# Patient Record
Sex: FEMALE | Race: WHITE | NOT HISPANIC OR LATINO | URBAN - METROPOLITAN AREA
[De-identification: names, ages, dates, MRNs, and addresses within clinical notes are randomized per-mention and may not be internally consistent; named-entity substitution may affect disease eponyms.]

---

## 2020-08-07 ENCOUNTER — HOSPITAL ENCOUNTER (INPATIENT)
Facility: HOSPITAL | Age: 35
LOS: 1 days | Discharge: STILL ADMITTED OR ADMINISTRATIVE | DRG: 064 | End: 2020-08-08
Attending: EMERGENCY MEDICINE | Admitting: PSYCHIATRY & NEUROLOGY
Payer: OTHER GOVERNMENT

## 2020-08-07 DIAGNOSIS — I61.9 NONTRAUMATIC INTRACEREBRAL HEMORRHAGE, UNSPECIFIED CEREBRAL LOCATION, UNSPECIFIED LATERALITY: Primary | ICD-10-CM

## 2020-08-07 DIAGNOSIS — I61.4 CEREBELLAR HEMORRHAGE: ICD-10-CM

## 2020-08-07 PROBLEM — G93.5 BRAIN HERNIATION: Status: ACTIVE | Noted: 2020-08-07

## 2020-08-07 PROBLEM — J96.00 ACUTE RESPIRATORY FAILURE: Status: ACTIVE | Noted: 2020-08-07

## 2020-08-07 PROBLEM — I46.9 CARDIAC ARREST: Status: ACTIVE | Noted: 2020-08-07

## 2020-08-07 PROBLEM — R40.2430 GLASGOW COMA SCALE TOTAL SCORE 3-8: Status: ACTIVE | Noted: 2020-08-07

## 2020-08-07 LAB
ALBUMIN SERPL BCP-MCNC: 4.3 G/DL (ref 3.5–5.2)
ALLENS TEST: ABNORMAL
ALP SERPL-CCNC: 89 U/L (ref 55–135)
ALT SERPL W/O P-5'-P-CCNC: 57 U/L (ref 10–44)
ANION GAP SERPL CALC-SCNC: 11 MMOL/L (ref 8–16)
AST SERPL-CCNC: 47 U/L (ref 10–40)
BASOPHILS # BLD AUTO: 0.04 K/UL (ref 0–0.2)
BASOPHILS NFR BLD: 0.2 % (ref 0–1.9)
BILIRUB SERPL-MCNC: 0.3 MG/DL (ref 0.1–1)
BUN SERPL-MCNC: 12 MG/DL (ref 6–20)
CALCIUM SERPL-MCNC: 8.3 MG/DL (ref 8.7–10.5)
CHLORIDE SERPL-SCNC: 108 MMOL/L (ref 95–110)
CO2 SERPL-SCNC: 19 MMOL/L (ref 23–29)
CREAT SERPL-MCNC: 0.9 MG/DL (ref 0.5–1.4)
DELSYS: ABNORMAL
DIFFERENTIAL METHOD: ABNORMAL
EOSINOPHIL # BLD AUTO: 0 K/UL (ref 0–0.5)
EOSINOPHIL NFR BLD: 0 % (ref 0–8)
ERYTHROCYTE [DISTWIDTH] IN BLOOD BY AUTOMATED COUNT: 12.8 % (ref 11.5–14.5)
ERYTHROCYTE [SEDIMENTATION RATE] IN BLOOD BY WESTERGREN METHOD: 22 MM/H
EST. GFR  (AFRICAN AMERICAN): >60 ML/MIN/1.73 M^2
EST. GFR  (NON AFRICAN AMERICAN): >60 ML/MIN/1.73 M^2
FIO2: 80
GLUCOSE SERPL-MCNC: 198 MG/DL (ref 70–110)
HCO3 UR-SCNC: 21 MMOL/L (ref 24–28)
HCT VFR BLD AUTO: 43.8 % (ref 37–48.5)
HGB BLD-MCNC: 14 G/DL (ref 12–16)
IMM GRANULOCYTES # BLD AUTO: 0.2 K/UL (ref 0–0.04)
IMM GRANULOCYTES NFR BLD AUTO: 0.8 % (ref 0–0.5)
LYMPHOCYTES # BLD AUTO: 1 K/UL (ref 1–4.8)
LYMPHOCYTES NFR BLD: 4.2 % (ref 18–48)
MCH RBC QN AUTO: 28.2 PG (ref 27–31)
MCHC RBC AUTO-ENTMCNC: 32 G/DL (ref 32–36)
MCV RBC AUTO: 88 FL (ref 82–98)
MIN VOL: 10
MODE: ABNORMAL
MONOCYTES # BLD AUTO: 1.3 K/UL (ref 0.3–1)
MONOCYTES NFR BLD: 5.1 % (ref 4–15)
NEUTROPHILS # BLD AUTO: 21.9 K/UL (ref 1.8–7.7)
NEUTROPHILS NFR BLD: 89.7 % (ref 38–73)
NRBC BLD-RTO: 0 /100 WBC
PCO2 BLDA: 49.4 MMHG (ref 35–45)
PEEP: 5
PH SMN: 7.24 [PH] (ref 7.35–7.45)
PIP: 22
PLATELET # BLD AUTO: 463 K/UL (ref 150–350)
PLATELET BLD QL SMEAR: ABNORMAL
PMV BLD AUTO: 9.5 FL (ref 9.2–12.9)
PO2 BLDA: 124 MMHG (ref 80–100)
POC BE: -6 MMOL/L
POC SATURATED O2: 98 % (ref 95–100)
POC TCO2: 23 MMOL/L (ref 23–27)
POTASSIUM SERPL-SCNC: 4.8 MMOL/L (ref 3.5–5.1)
PROT SERPL-MCNC: 8 G/DL (ref 6–8.4)
RBC # BLD AUTO: 4.97 M/UL (ref 4–5.4)
SAMPLE: ABNORMAL
SARS-COV-2 RDRP RESP QL NAA+PROBE: NEGATIVE
SITE: ABNORMAL
SODIUM SERPL-SCNC: 138 MMOL/L (ref 136–145)
SP02: 98
VT: 450
WBC # BLD AUTO: 24.42 K/UL (ref 3.9–12.7)

## 2020-08-07 PROCEDURE — 86901 BLOOD TYPING SEROLOGIC RH(D): CPT

## 2020-08-07 PROCEDURE — 99291 PR CRITICAL CARE, E/M 30-74 MINUTES: ICD-10-PCS | Mod: ,,, | Performed by: PHYSICIAN ASSISTANT

## 2020-08-07 PROCEDURE — 27200966 HC CLOSED SUCTION SYSTEM

## 2020-08-07 PROCEDURE — 85025 COMPLETE CBC W/AUTO DIFF WBC: CPT

## 2020-08-07 PROCEDURE — 25500020 PHARM REV CODE 255: Performed by: EMERGENCY MEDICINE

## 2020-08-07 PROCEDURE — 94761 N-INVAS EAR/PLS OXIMETRY MLT: CPT

## 2020-08-07 PROCEDURE — 12000002 HC ACUTE/MED SURGE SEMI-PRIVATE ROOM

## 2020-08-07 PROCEDURE — 99499 UNLISTED E&M SERVICE: CPT | Mod: ,,, | Performed by: PSYCHIATRY & NEUROLOGY

## 2020-08-07 PROCEDURE — 85610 PROTHROMBIN TIME: CPT

## 2020-08-07 PROCEDURE — 99499 NO LOS: ICD-10-PCS | Mod: ,,, | Performed by: PSYCHIATRY & NEUROLOGY

## 2020-08-07 PROCEDURE — 27000221 HC OXYGEN, UP TO 24 HOURS

## 2020-08-07 PROCEDURE — 83036 HEMOGLOBIN GLYCOSYLATED A1C: CPT

## 2020-08-07 PROCEDURE — 80061 LIPID PANEL: CPT

## 2020-08-07 PROCEDURE — 94002 VENT MGMT INPAT INIT DAY: CPT

## 2020-08-07 PROCEDURE — 99232 PR SUBSEQUENT HOSPITAL CARE,LEVL II: ICD-10-PCS | Mod: ,,, | Performed by: NEUROLOGICAL SURGERY

## 2020-08-07 PROCEDURE — 82803 BLOOD GASES ANY COMBINATION: CPT

## 2020-08-07 PROCEDURE — 99291 CRITICAL CARE FIRST HOUR: CPT

## 2020-08-07 PROCEDURE — 99291 CRITICAL CARE FIRST HOUR: CPT | Mod: ,,, | Performed by: PHYSICIAN ASSISTANT

## 2020-08-07 PROCEDURE — 80053 COMPREHEN METABOLIC PANEL: CPT

## 2020-08-07 PROCEDURE — 36600 WITHDRAWAL OF ARTERIAL BLOOD: CPT

## 2020-08-07 PROCEDURE — 84443 ASSAY THYROID STIM HORMONE: CPT

## 2020-08-07 PROCEDURE — U0002 COVID-19 LAB TEST NON-CDC: HCPCS

## 2020-08-07 PROCEDURE — 27100171 HC OXYGEN HIGH FLOW UP TO 24 HOURS

## 2020-08-07 PROCEDURE — 99232 SBSQ HOSP IP/OBS MODERATE 35: CPT | Mod: ,,, | Performed by: NEUROLOGICAL SURGERY

## 2020-08-07 PROCEDURE — 99900035 HC TECH TIME PER 15 MIN (STAT)

## 2020-08-07 PROCEDURE — 85730 THROMBOPLASTIN TIME PARTIAL: CPT

## 2020-08-07 RX ORDER — SODIUM CHLORIDE 0.9 % (FLUSH) 0.9 %
10 SYRINGE (ML) INJECTION
Status: DISCONTINUED | OUTPATIENT
Start: 2020-08-08 | End: 2020-08-08 | Stop reason: HOSPADM

## 2020-08-07 RX ORDER — FAMOTIDINE 20 MG/1
20 TABLET, FILM COATED ORAL 2 TIMES DAILY
Status: DISCONTINUED | OUTPATIENT
Start: 2020-08-08 | End: 2020-08-08 | Stop reason: HOSPADM

## 2020-08-07 RX ORDER — ONDANSETRON 8 MG/1
8 TABLET, ORALLY DISINTEGRATING ORAL EVERY 8 HOURS PRN
Status: DISCONTINUED | OUTPATIENT
Start: 2020-08-08 | End: 2020-08-08 | Stop reason: HOSPADM

## 2020-08-07 RX ORDER — LABETALOL HCL 20 MG/4 ML
10 SYRINGE (ML) INTRAVENOUS EVERY 4 HOURS PRN
Status: DISCONTINUED | OUTPATIENT
Start: 2020-08-08 | End: 2020-08-08

## 2020-08-07 RX ORDER — ACETAMINOPHEN 325 MG/1
650 TABLET ORAL EVERY 6 HOURS PRN
Status: DISCONTINUED | OUTPATIENT
Start: 2020-08-08 | End: 2020-08-08 | Stop reason: HOSPADM

## 2020-08-07 RX ORDER — SODIUM CHLORIDE 9 MG/ML
INJECTION, SOLUTION INTRAVENOUS CONTINUOUS
Status: DISCONTINUED | OUTPATIENT
Start: 2020-08-07 | End: 2020-08-08 | Stop reason: HOSPADM

## 2020-08-07 RX ADMIN — IOHEXOL 100 ML: 350 INJECTION, SOLUTION INTRAVENOUS at 09:08

## 2020-08-08 ENCOUNTER — HOSPITAL ENCOUNTER (INPATIENT)
Facility: HOSPITAL | Age: 35
LOS: 2 days | DRG: 700 | End: 2020-08-10
Payer: COMMERCIAL

## 2020-08-08 VITALS
OXYGEN SATURATION: 96 % | WEIGHT: 208.31 LBS | TEMPERATURE: 97 F | DIASTOLIC BLOOD PRESSURE: 61 MMHG | HEIGHT: 66 IN | SYSTOLIC BLOOD PRESSURE: 96 MMHG | BODY MASS INDEX: 33.48 KG/M2 | HEART RATE: 98 BPM | RESPIRATION RATE: 14 BRPM

## 2020-08-08 DIAGNOSIS — Z52.9 ORGAN DONOR: ICD-10-CM

## 2020-08-08 DIAGNOSIS — Z01.89: ICD-10-CM

## 2020-08-08 DIAGNOSIS — Z52.9 ORGAN DONATION: ICD-10-CM

## 2020-08-08 PROBLEM — G93.5 BRAIN COMPRESSION: Status: ACTIVE | Noted: 2020-08-08

## 2020-08-08 PROBLEM — J96.02 ACUTE RESPIRATORY FAILURE WITH HYPOXIA AND HYPERCAPNIA: Status: ACTIVE | Noted: 2020-08-07

## 2020-08-08 PROBLEM — J96.01 ACUTE RESPIRATORY FAILURE WITH HYPOXIA AND HYPERCAPNIA: Status: ACTIVE | Noted: 2020-08-07

## 2020-08-08 LAB
ABO + RH BLD: NORMAL
ABO + RH BLD: NORMAL
ALBUMIN SERPL BCP-MCNC: 3.6 G/DL (ref 3.5–5.2)
ALBUMIN SERPL BCP-MCNC: 4.2 G/DL (ref 3.5–5.2)
ALLENS TEST: ABNORMAL
ALP SERPL-CCNC: 74 U/L (ref 55–135)
ALP SERPL-CCNC: 89 U/L (ref 55–135)
ALT SERPL W/O P-5'-P-CCNC: 41 U/L (ref 10–44)
ALT SERPL W/O P-5'-P-CCNC: 55 U/L (ref 10–44)
AMYLASE SERPL-CCNC: 1052 U/L (ref 20–110)
ANION GAP SERPL CALC-SCNC: 15 MMOL/L (ref 8–16)
ANION GAP SERPL CALC-SCNC: ABNORMAL MMOL/L (ref 8–16)
APTT BLDCRRT: 27.6 SEC (ref 21–32)
APTT BLDCRRT: 29.2 SEC (ref 21–32)
APTT BLDCRRT: 29.8 SEC (ref 21–32)
AST SERPL-CCNC: 20 U/L (ref 10–40)
AST SERPL-CCNC: 32 U/L (ref 10–40)
BASOPHILS # BLD AUTO: 0.03 K/UL (ref 0–0.2)
BASOPHILS # BLD AUTO: 0.04 K/UL (ref 0–0.2)
BASOPHILS NFR BLD: 0.1 % (ref 0–1.9)
BASOPHILS NFR BLD: 0.2 % (ref 0–1.9)
BILIRUB DIRECT SERPL-MCNC: 0.1 MG/DL (ref 0.1–0.3)
BILIRUB SERPL-MCNC: 0.3 MG/DL (ref 0.1–1)
BILIRUB SERPL-MCNC: 0.4 MG/DL (ref 0.1–1)
BILIRUB UR QL STRIP: NEGATIVE
BLD GP AB SCN CELLS X3 SERPL QL: NORMAL
BLD GP AB SCN CELLS X3 SERPL QL: NORMAL
BUN SERPL-MCNC: 10 MG/DL (ref 6–20)
BUN SERPL-MCNC: 8 MG/DL (ref 6–20)
CALCIUM SERPL-MCNC: 8.9 MG/DL (ref 8.7–10.5)
CALCIUM SERPL-MCNC: 9.6 MG/DL (ref 8.7–10.5)
CHLORIDE SERPL-SCNC: 111 MMOL/L (ref 95–110)
CHLORIDE SERPL-SCNC: >130 MMOL/L (ref 95–110)
CHOLEST SERPL-MCNC: 193 MG/DL (ref 120–199)
CHOLEST/HDLC SERPL: 4.4 {RATIO} (ref 2–5)
CK MB SERPL-MCNC: 8.6 NG/ML (ref 0.1–6.5)
CK MB SERPL-RTO: 8.3 % (ref 0–5)
CK SERPL-CCNC: 104 U/L (ref 20–180)
CK SERPL-CCNC: 104 U/L (ref 20–180)
CLARITY UR REFRACT.AUTO: CLEAR
CO2 SERPL-SCNC: 20 MMOL/L (ref 23–29)
CO2 SERPL-SCNC: 22 MMOL/L (ref 23–29)
COLOR UR AUTO: COLORLESS
CREAT SERPL-MCNC: 0.8 MG/DL (ref 0.5–1.4)
CREAT SERPL-MCNC: 0.8 MG/DL (ref 0.5–1.4)
DELSYS: ABNORMAL
DIFFERENTIAL METHOD: ABNORMAL
DIFFERENTIAL METHOD: ABNORMAL
EOSINOPHIL # BLD AUTO: 0 K/UL (ref 0–0.5)
EOSINOPHIL # BLD AUTO: 0 K/UL (ref 0–0.5)
EOSINOPHIL NFR BLD: 0 % (ref 0–8)
EOSINOPHIL NFR BLD: 0 % (ref 0–8)
ERYTHROCYTE [DISTWIDTH] IN BLOOD BY AUTOMATED COUNT: 12.8 % (ref 11.5–14.5)
ERYTHROCYTE [DISTWIDTH] IN BLOOD BY AUTOMATED COUNT: 13.6 % (ref 11.5–14.5)
ERYTHROCYTE [SEDIMENTATION RATE] IN BLOOD BY WESTERGREN METHOD: 12 MM/H
ERYTHROCYTE [SEDIMENTATION RATE] IN BLOOD BY WESTERGREN METHOD: 14 MM/H
ERYTHROCYTE [SEDIMENTATION RATE] IN BLOOD BY WESTERGREN METHOD: 16 MM/H
ERYTHROCYTE [SEDIMENTATION RATE] IN BLOOD BY WESTERGREN METHOD: 16 MM/H
ERYTHROCYTE [SEDIMENTATION RATE] IN BLOOD BY WESTERGREN METHOD: 22 MM/H
EST. GFR  (AFRICAN AMERICAN): >60 ML/MIN/1.73 M^2
EST. GFR  (AFRICAN AMERICAN): >60 ML/MIN/1.73 M^2
EST. GFR  (NON AFRICAN AMERICAN): >60 ML/MIN/1.73 M^2
EST. GFR  (NON AFRICAN AMERICAN): >60 ML/MIN/1.73 M^2
ESTIMATED AVG GLUCOSE: 103 MG/DL (ref 68–131)
ESTIMATED AVG GLUCOSE: 105 MG/DL (ref 68–131)
FIO2: 100
FIO2: 100
FIO2: 40
FIO2: 50
FLOW: 15
FLOW: 15
GGT SERPL-CCNC: 50 U/L (ref 8–55)
GLUCOSE SERPL-MCNC: 193 MG/DL (ref 70–110)
GLUCOSE SERPL-MCNC: 208 MG/DL (ref 70–110)
GLUCOSE UR QL STRIP: NEGATIVE
HBA1C MFR BLD HPLC: 5.2 % (ref 4–5.6)
HBA1C MFR BLD HPLC: 5.3 % (ref 4–5.6)
HCO3 UR-SCNC: 18.1 MMOL/L (ref 24–28)
HCO3 UR-SCNC: 18.1 MMOL/L (ref 24–28)
HCO3 UR-SCNC: 18.6 MMOL/L (ref 24–28)
HCO3 UR-SCNC: 21.9 MMOL/L (ref 24–28)
HCO3 UR-SCNC: 22.2 MMOL/L (ref 24–28)
HCO3 UR-SCNC: 23 MMOL/L (ref 24–28)
HCO3 UR-SCNC: 24.6 MMOL/L (ref 24–28)
HCO3 UR-SCNC: 25.5 MMOL/L (ref 24–28)
HCO3 UR-SCNC: 26.8 MMOL/L (ref 24–28)
HCT VFR BLD AUTO: 38.5 % (ref 37–48.5)
HCT VFR BLD AUTO: 41.9 % (ref 37–48.5)
HDLC SERPL-MCNC: 44 MG/DL (ref 40–75)
HDLC SERPL: 22.8 % (ref 20–50)
HGB BLD-MCNC: 12.2 G/DL (ref 12–16)
HGB BLD-MCNC: 13.5 G/DL (ref 12–16)
HGB UR QL STRIP: ABNORMAL
IMM GRANULOCYTES # BLD AUTO: 0.12 K/UL (ref 0–0.04)
IMM GRANULOCYTES # BLD AUTO: 0.16 K/UL (ref 0–0.04)
IMM GRANULOCYTES NFR BLD AUTO: 0.6 % (ref 0–0.5)
IMM GRANULOCYTES NFR BLD AUTO: 0.8 % (ref 0–0.5)
INR PPP: 0.9 (ref 0.8–1.2)
KETONES UR QL STRIP: NEGATIVE
LDH SERPL L TO P-CCNC: 226 U/L (ref 110–260)
LDLC SERPL CALC-MCNC: 120 MG/DL (ref 63–159)
LEUKOCYTE ESTERASE UR QL STRIP: NEGATIVE
LIPASE SERPL-CCNC: 62 U/L (ref 4–60)
LYMPHOCYTES # BLD AUTO: 0.5 K/UL (ref 1–4.8)
LYMPHOCYTES # BLD AUTO: 1.2 K/UL (ref 1–4.8)
LYMPHOCYTES NFR BLD: 2.5 % (ref 18–48)
LYMPHOCYTES NFR BLD: 5.8 % (ref 18–48)
MAGNESIUM SERPL-MCNC: 1.9 MG/DL (ref 1.6–2.6)
MAGNESIUM SERPL-MCNC: 2.5 MG/DL (ref 1.6–2.6)
MCH RBC QN AUTO: 28 PG (ref 27–31)
MCH RBC QN AUTO: 28.4 PG (ref 27–31)
MCHC RBC AUTO-ENTMCNC: 31.7 G/DL (ref 32–36)
MCHC RBC AUTO-ENTMCNC: 32.2 G/DL (ref 32–36)
MCV RBC AUTO: 87 FL (ref 82–98)
MCV RBC AUTO: 90 FL (ref 82–98)
MICROSCOPIC COMMENT: NORMAL
MODE: ABNORMAL
MONOCYTES # BLD AUTO: 1.2 K/UL (ref 0.3–1)
MONOCYTES # BLD AUTO: 1.7 K/UL (ref 0.3–1)
MONOCYTES NFR BLD: 5.7 % (ref 4–15)
MONOCYTES NFR BLD: 8.4 % (ref 4–15)
NEUTROPHILS # BLD AUTO: 17.2 K/UL (ref 1.8–7.7)
NEUTROPHILS # BLD AUTO: 19.7 K/UL (ref 1.8–7.7)
NEUTROPHILS NFR BLD: 84.9 % (ref 38–73)
NEUTROPHILS NFR BLD: 91 % (ref 38–73)
NITRITE UR QL STRIP: NEGATIVE
NONHDLC SERPL-MCNC: 149 MG/DL
NRBC BLD-RTO: 0 /100 WBC
NRBC BLD-RTO: 0 /100 WBC
PCO2 BLDA: 27.8 MMHG (ref 35–45)
PCO2 BLDA: 31.2 MMHG (ref 35–45)
PCO2 BLDA: 33.6 MMHG (ref 35–45)
PCO2 BLDA: 45.1 MMHG (ref 35–45)
PCO2 BLDA: 46.6 MMHG (ref 35–45)
PCO2 BLDA: 47.8 MMHG (ref 35–45)
PCO2 BLDA: 49.6 MMHG (ref 35–45)
PCO2 BLDA: 66.6 MMHG (ref 35–45)
PCO2 BLDA: 79.5 MMHG (ref 35–45)
PEEP: 5
PEEP: 8
PEEP: 8
PH SMN: 7.13 [PH] (ref 7.35–7.45)
PH SMN: 7.19 [PH] (ref 7.35–7.45)
PH SMN: 7.29 [PH] (ref 7.35–7.45)
PH SMN: 7.3 [PH] (ref 7.35–7.45)
PH SMN: 7.35 [PH] (ref 7.35–7.45)
PH SMN: 7.37 [PH] (ref 7.35–7.45)
PH SMN: 7.42 [PH] (ref 7.35–7.45)
PH UR STRIP: 6 [PH] (ref 5–8)
PHOSPHATE SERPL-MCNC: 2 MG/DL (ref 2.7–4.5)
PHOSPHATE SERPL-MCNC: 3.6 MG/DL (ref 2.7–4.5)
PLATELET # BLD AUTO: 360 K/UL (ref 150–350)
PLATELET # BLD AUTO: 376 K/UL (ref 150–350)
PMV BLD AUTO: 10.1 FL (ref 9.2–12.9)
PMV BLD AUTO: 10.8 FL (ref 9.2–12.9)
PO2 BLDA: 113 MMHG (ref 80–100)
PO2 BLDA: 160 MMHG (ref 80–100)
PO2 BLDA: 164 MMHG (ref 80–100)
PO2 BLDA: 191 MMHG (ref 80–100)
PO2 BLDA: 415 MMHG (ref 80–100)
PO2 BLDA: 445 MMHG (ref 80–100)
PO2 BLDA: 446 MMHG (ref 80–100)
PO2 BLDA: 487 MMHG (ref 80–100)
PO2 BLDA: 83 MMHG (ref 80–100)
POC BE: -2 MMOL/L
POC BE: -2 MMOL/L
POC BE: -3 MMOL/L
POC BE: -4 MMOL/L
POC BE: -4 MMOL/L
POC BE: -5 MMOL/L
POC BE: -6 MMOL/L
POC BE: -7 MMOL/L
POC BE: -7 MMOL/L
POC SATURATED O2: 100 % (ref 95–100)
POC SATURATED O2: 97 % (ref 95–100)
POC SATURATED O2: 98 % (ref 95–100)
POC SATURATED O2: 99 % (ref 95–100)
POC SATURATED O2: 99 % (ref 95–100)
POC TCO2: 19 MMOL/L (ref 23–27)
POC TCO2: 19 MMOL/L (ref 23–27)
POC TCO2: 20 MMOL/L (ref 23–27)
POC TCO2: 23 MMOL/L (ref 23–27)
POC TCO2: 24 MMOL/L (ref 23–27)
POC TCO2: 24 MMOL/L (ref 23–27)
POC TCO2: 26 MMOL/L (ref 23–27)
POC TCO2: 28 MMOL/L (ref 23–27)
POC TCO2: 29 MMOL/L (ref 23–27)
POTASSIUM SERPL-SCNC: 3.9 MMOL/L (ref 3.5–5.1)
POTASSIUM SERPL-SCNC: 4.2 MMOL/L (ref 3.5–5.1)
PROT SERPL-MCNC: 6.9 G/DL (ref 6–8.4)
PROT SERPL-MCNC: 7.8 G/DL (ref 6–8.4)
PROT UR QL STRIP: NEGATIVE
PROTHROMBIN TIME: 10.4 SEC (ref 9–12.5)
PROTHROMBIN TIME: 10.5 SEC (ref 9–12.5)
PROTHROMBIN TIME: 10.5 SEC (ref 9–12.5)
RBC # BLD AUTO: 4.3 M/UL (ref 4–5.4)
RBC # BLD AUTO: 4.83 M/UL (ref 4–5.4)
RBC #/AREA URNS AUTO: 1 /HPF (ref 0–4)
SAMPLE: ABNORMAL
SITE: ABNORMAL
SODIUM SERPL-SCNC: 145 MMOL/L (ref 136–145)
SODIUM SERPL-SCNC: 146 MMOL/L (ref 136–145)
SODIUM SERPL-SCNC: 161 MMOL/L (ref 136–145)
SP GR UR STRIP: 1 (ref 1–1.03)
SP02: 98
SP02: 99
SQUAMOUS #/AREA URNS AUTO: 0 /HPF
TRIGL SERPL-MCNC: 145 MG/DL (ref 30–150)
TROPONIN I SERPL DL<=0.01 NG/ML-MCNC: 0.68 NG/ML (ref 0–0.03)
TSH SERPL DL<=0.005 MIU/L-ACNC: 0.66 UIU/ML (ref 0.4–4)
URN SPEC COLLECT METH UR: ABNORMAL
VT: 450
VT: 460
VT: 480
VT: 480
VT: 550
WBC # BLD AUTO: 20.3 K/UL (ref 3.9–12.7)
WBC # BLD AUTO: 21.57 K/UL (ref 3.9–12.7)
WBC #/AREA URNS AUTO: 0 /HPF (ref 0–5)

## 2020-08-08 PROCEDURE — 36620 INSERTION CATHETER ARTERY: CPT

## 2020-08-08 PROCEDURE — 87086 URINE CULTURE/COLONY COUNT: CPT

## 2020-08-08 PROCEDURE — 99291 PR CRITICAL CARE, E/M 30-74 MINUTES: ICD-10-PCS | Mod: ,,, | Performed by: PSYCHIATRY & NEUROLOGY

## 2020-08-08 PROCEDURE — 83690 ASSAY OF LIPASE: CPT

## 2020-08-08 PROCEDURE — 25000003 PHARM REV CODE 250

## 2020-08-08 PROCEDURE — 82803 BLOOD GASES ANY COMBINATION: CPT

## 2020-08-08 PROCEDURE — 83615 LACTATE (LD) (LDH) ENZYME: CPT

## 2020-08-08 PROCEDURE — 85730 THROMBOPLASTIN TIME PARTIAL: CPT | Mod: 91

## 2020-08-08 PROCEDURE — 82550 ASSAY OF CK (CPK): CPT

## 2020-08-08 PROCEDURE — 83735 ASSAY OF MAGNESIUM: CPT

## 2020-08-08 PROCEDURE — 85730 THROMBOPLASTIN TIME PARTIAL: CPT

## 2020-08-08 PROCEDURE — 86850 RBC ANTIBODY SCREEN: CPT

## 2020-08-08 PROCEDURE — 99900025 HC BRONCHOSCOPY-ASST (STAT)

## 2020-08-08 PROCEDURE — 27200188 HC TRANSDUCER, ART ADULT/PEDS

## 2020-08-08 PROCEDURE — 82977 ASSAY OF GGT: CPT

## 2020-08-08 PROCEDURE — 84100 ASSAY OF PHOSPHORUS: CPT | Mod: 91

## 2020-08-08 PROCEDURE — 81001 URINALYSIS AUTO W/SCOPE: CPT

## 2020-08-08 PROCEDURE — 85610 PROTHROMBIN TIME: CPT | Mod: 91

## 2020-08-08 PROCEDURE — 83036 HEMOGLOBIN GLYCOSYLATED A1C: CPT

## 2020-08-08 PROCEDURE — 84100 ASSAY OF PHOSPHORUS: CPT

## 2020-08-08 PROCEDURE — 99900026 HC AIRWAY MAINTENANCE (STAT)

## 2020-08-08 PROCEDURE — 85025 COMPLETE CBC W/AUTO DIFF WBC: CPT | Mod: 91

## 2020-08-08 PROCEDURE — 99900035 HC TECH TIME PER 15 MIN (STAT)

## 2020-08-08 PROCEDURE — 37799 UNLISTED PX VASCULAR SURGERY: CPT

## 2020-08-08 PROCEDURE — 27200966 HC CLOSED SUCTION SYSTEM

## 2020-08-08 PROCEDURE — 36620 INSERTION CATHETER ARTERY: CPT | Mod: ,,, | Performed by: NURSE PRACTITIONER

## 2020-08-08 PROCEDURE — 84484 ASSAY OF TROPONIN QUANT: CPT

## 2020-08-08 PROCEDURE — 82248 BILIRUBIN DIRECT: CPT

## 2020-08-08 PROCEDURE — 94003 VENT MGMT INPAT SUBQ DAY: CPT

## 2020-08-08 PROCEDURE — 87070 CULTURE OTHR SPECIMN AEROBIC: CPT

## 2020-08-08 PROCEDURE — 36620 ARTERIAL LINE: ICD-10-PCS | Mod: ,,, | Performed by: NURSE PRACTITIONER

## 2020-08-08 PROCEDURE — 85025 COMPLETE CBC W/AUTO DIFF WBC: CPT

## 2020-08-08 PROCEDURE — 27202055 HC BRONCHOSCOPE, DISP

## 2020-08-08 PROCEDURE — 83735 ASSAY OF MAGNESIUM: CPT | Mod: 91

## 2020-08-08 PROCEDURE — 82553 CREATINE MB FRACTION: CPT

## 2020-08-08 PROCEDURE — 97802 MEDICAL NUTRITION INDIV IN: CPT

## 2020-08-08 PROCEDURE — 27000221 HC OXYGEN, UP TO 24 HOURS

## 2020-08-08 PROCEDURE — 94761 N-INVAS EAR/PLS OXIMETRY MLT: CPT

## 2020-08-08 PROCEDURE — 87205 SMEAR GRAM STAIN: CPT

## 2020-08-08 PROCEDURE — 87040 BLOOD CULTURE FOR BACTERIA: CPT

## 2020-08-08 PROCEDURE — 80053 COMPREHEN METABOLIC PANEL: CPT | Mod: 91

## 2020-08-08 PROCEDURE — 99291 CRITICAL CARE FIRST HOUR: CPT | Mod: ,,, | Performed by: PSYCHIATRY & NEUROLOGY

## 2020-08-08 PROCEDURE — 85610 PROTHROMBIN TIME: CPT

## 2020-08-08 PROCEDURE — 82150 ASSAY OF AMYLASE: CPT

## 2020-08-08 PROCEDURE — 84295 ASSAY OF SERUM SODIUM: CPT

## 2020-08-08 PROCEDURE — 20000000 HC ICU ROOM

## 2020-08-08 PROCEDURE — 25000003 PHARM REV CODE 250: Performed by: NURSE PRACTITIONER

## 2020-08-08 PROCEDURE — 80053 COMPREHEN METABOLIC PANEL: CPT

## 2020-08-08 PROCEDURE — 63600175 PHARM REV CODE 636 W HCPCS

## 2020-08-08 RX ORDER — FLUCONAZOLE 2 MG/ML
100 INJECTION, SOLUTION INTRAVENOUS
Status: DISCONTINUED | OUTPATIENT
Start: 2020-08-08 | End: 2020-08-19 | Stop reason: HOSPADM

## 2020-08-08 RX ORDER — PHENYLEPHRINE HCL IN 0.9% NACL 1 MG/10 ML
SYRINGE (ML) INTRAVENOUS
Status: COMPLETED
Start: 2020-08-08 | End: 2020-08-08

## 2020-08-08 RX ORDER — LEVALBUTEROL 1.25 MG/.5ML
1.25 SOLUTION, CONCENTRATE RESPIRATORY (INHALATION) EVERY 8 HOURS
Status: DISCONTINUED | OUTPATIENT
Start: 2020-08-09 | End: 2020-08-09

## 2020-08-08 RX ORDER — NOREPINEPHRINE BITARTRATE/D5W 4MG/250ML
PLASTIC BAG, INJECTION (ML) INTRAVENOUS
Status: COMPLETED
Start: 2020-08-08 | End: 2020-08-08

## 2020-08-08 RX ORDER — IPRATROPIUM BROMIDE 0.5 MG/2.5ML
0.5 SOLUTION RESPIRATORY (INHALATION) EVERY 4 HOURS
Status: DISCONTINUED | OUTPATIENT
Start: 2020-08-09 | End: 2020-08-19 | Stop reason: HOSPADM

## 2020-08-08 RX ORDER — ALBUTEROL SULFATE 2.5 MG/.5ML
2.5 SOLUTION RESPIRATORY (INHALATION) EVERY 4 HOURS PRN
Status: DISCONTINUED | OUTPATIENT
Start: 2020-08-08 | End: 2020-08-19 | Stop reason: HOSPADM

## 2020-08-08 RX ORDER — LABETALOL HCL 20 MG/4 ML
10 SYRINGE (ML) INTRAVENOUS EVERY 6 HOURS PRN
Status: DISCONTINUED | OUTPATIENT
Start: 2020-08-08 | End: 2020-08-08 | Stop reason: HOSPADM

## 2020-08-08 RX ORDER — SODIUM CHLORIDE 450 MG/100ML
INJECTION, SOLUTION INTRAVENOUS CONTINUOUS
Status: DISCONTINUED | OUTPATIENT
Start: 2020-08-08 | End: 2020-08-19 | Stop reason: HOSPADM

## 2020-08-08 RX ORDER — NOREPINEPHRINE BITARTRATE/D5W 4MG/250ML
0.02 PLASTIC BAG, INJECTION (ML) INTRAVENOUS CONTINUOUS
Status: DISCONTINUED | OUTPATIENT
Start: 2020-08-08 | End: 2020-08-08 | Stop reason: HOSPADM

## 2020-08-08 RX ORDER — METOPROLOL TARTRATE 1 MG/ML
5 INJECTION, SOLUTION INTRAVENOUS ONCE
Status: COMPLETED | OUTPATIENT
Start: 2020-08-08 | End: 2020-08-08

## 2020-08-08 RX ORDER — NOREPINEPHRINE BITARTRATE/D5W 4MG/250ML
0.02 PLASTIC BAG, INJECTION (ML) INTRAVENOUS CONTINUOUS
Status: DISCONTINUED | OUTPATIENT
Start: 2020-08-08 | End: 2020-08-19 | Stop reason: HOSPADM

## 2020-08-08 RX ORDER — NICARDIPINE HYDROCHLORIDE 0.2 MG/ML
2.5 INJECTION INTRAVENOUS CONTINUOUS
Status: DISCONTINUED | OUTPATIENT
Start: 2020-08-08 | End: 2020-08-08 | Stop reason: HOSPADM

## 2020-08-08 RX ORDER — METOPROLOL TARTRATE 1 MG/ML
INJECTION, SOLUTION INTRAVENOUS
Status: COMPLETED
Start: 2020-08-08 | End: 2020-08-08

## 2020-08-08 RX ADMIN — HYPROMELLOSE 2910 1 DROP: 5 SOLUTION OPHTHALMIC at 09:08

## 2020-08-08 RX ADMIN — VASOPRESSIN 0.01 UNITS/MIN: 20 INJECTION INTRAVENOUS at 04:08

## 2020-08-08 RX ADMIN — Medication 0.1 MCG/KG/MIN: at 03:08

## 2020-08-08 RX ADMIN — Medication 10 MG: at 09:08

## 2020-08-08 RX ADMIN — FAMOTIDINE 20 MG: 20 TABLET ORAL at 09:08

## 2020-08-08 RX ADMIN — SODIUM CHLORIDE 1000 ML: 0.9 INJECTION, SOLUTION INTRAVENOUS at 02:08

## 2020-08-08 RX ADMIN — FAMOTIDINE 20 MG: 20 TABLET ORAL at 01:08

## 2020-08-08 RX ADMIN — Medication 0.1 MCG/KG/MIN: at 09:08

## 2020-08-08 RX ADMIN — SODIUM CHLORIDE, SODIUM LACTATE, POTASSIUM CHLORIDE, AND CALCIUM CHLORIDE 500 ML: .6; .31; .03; .02 INJECTION, SOLUTION INTRAVENOUS at 09:08

## 2020-08-08 RX ADMIN — Medication 600 MCG: at 03:08

## 2020-08-08 RX ADMIN — SODIUM CHLORIDE 1000 ML: 0.9 INJECTION, SOLUTION INTRAVENOUS at 03:08

## 2020-08-08 RX ADMIN — AMPICILLIN AND SULBACTAM 1.5 G: 1; .5 INJECTION, POWDER, FOR SOLUTION INTRAVENOUS at 09:08

## 2020-08-08 RX ADMIN — HYDROCORTISONE SODIUM SUCCINATE 300 MG: 100 INJECTION, POWDER, FOR SOLUTION INTRAMUSCULAR; INTRAVENOUS at 09:08

## 2020-08-08 RX ADMIN — SODIUM CHLORIDE: 0.45 INJECTION, SOLUTION INTRAVENOUS at 10:08

## 2020-08-08 RX ADMIN — FLUCONAZOLE 100 MG: 2 INJECTION, SOLUTION INTRAVENOUS at 11:08

## 2020-08-08 RX ADMIN — Medication 0.1 MCG/KG/MIN: at 02:08

## 2020-08-08 RX ADMIN — SODIUM CHLORIDE: 0.9 INJECTION, SOLUTION INTRAVENOUS at 12:08

## 2020-08-08 RX ADMIN — POTASSIUM CHLORIDE: 2 INJECTION, SOLUTION, CONCENTRATE INTRAVENOUS at 09:08

## 2020-08-08 RX ADMIN — METOPROLOL TARTRATE 5 MG: 5 INJECTION INTRAVENOUS at 02:08

## 2020-08-08 RX ADMIN — METOPROLOL TARTRATE 5 MG: 1 INJECTION, SOLUTION INTRAVENOUS at 02:08

## 2020-08-08 NOTE — ED NOTES
Train of Four completed at bedside with assistance from ILDA Caldera with NCC  Pt R brow reactive 4 twitches on level 2

## 2020-08-08 NOTE — CARE UPDATE
2157 ABG obtained and reported. Rate and Vt increased, FiO2 weaned to 40% due to results per Everardo Caldera NP.  Patient tolerating well and no distress noted. Will continue to monitor.

## 2020-08-08 NOTE — CARE UPDATE
Apnea study done per Dr. Lees @ bedside with resulting ABG's using 15 liter nasal cannula inserted into ETT.

## 2020-08-08 NOTE — NURSING
Patient arrived to [facility] from [place] ed>[id number]  Type of stroke/diagnosis:  SAH/IPH    Current symptoms: on vent with no sedation, no reflexes, no movement on any extremities, bilateral pupils fixed    Skin assessment done: Yes  Wounds noted: none  EVENS Armband Applied: yes  Patient Belongings on Admit: no belongings    NCC notified: CAITLYN dillard    [facility]

## 2020-08-08 NOTE — PROGRESS NOTES
While giving 0900 meds, patients BP steadily increased to 230s/120s and HR steadily increased to 150s. CAITLYN Joshi with NCC notified. MD Luis at the bedside. Orders were to give 10mg of Labetalol and get a ABG. Neuro status still the same, GCS 3 and no reflexes. Family notified. Will continue to monitor.

## 2020-08-08 NOTE — PT/OT/SLP PROGRESS
Occupational Therapy      Patient Name:  Niecy Sevilla   MRN:  [id number]    OT consult received. Patient not seen today secondary to poor medical prognosis & condition.  Discussed via secure chat with neuro team & decision was to discontinue OT orders on this date.    BRITTANY Anand  8/8/2020

## 2020-08-08 NOTE — SIGNIFICANT EVENT
ICU Attending    Brain Death Note     brain death exam    Patient family outside room    No sedation in last 12 hours  Recent Labs   Lab 08/08/20 0205   WBC 21.57*   RBC 4.83   HGB 13.5   HCT 41.9   *   MCV 87   MCH 28.0   MCHC 32.2     Recent Labs   Lab 08/08/20 0205 08/08/20  0454   CALCIUM 8.9  --    PROT 7.8  --    * 145   K 3.9  --    CO2 20*  --    *  --    BUN 10  --    CREATININE 0.8  --    ALKPHOS 89  --    ALT 55*  --    AST 32  --    BILITOT 0.4  --      Recent Labs   Lab 08/08/20 0205   INR 0.9   APTT 29.8     Vent Mode: A/C  Oxygen Concentration (%):  [40-80] 40  Resp Rate Total:  [12 br/min-26 br/min] 14 br/min  Vt Set:  [450 mL-480 mL] 460 mL  PEEP/CPAP:  [5 cmH20] 5 cmH20  Pressure Support:  [0 cmH20] 0 cmH20  Mean Airway Pressure:  [7.9 quY93-95 cmH20] 8.6 cmH20    Temp: 97.2 °F (36.2 °C)  Pulse: 106  Rhythm: normal sinus rhythm, sinus tachycardia  BP: 96/61  MAP (mmHg): 73  Resp: 14  SpO2: 98 %  Oxygen Concentration (%): 40  O2 Device (Oxygen Therapy): ventilator  Vent Mode: A/C  Set Rate: 14 BPM  Vt Set: 460 mL  PEEP/CPAP: 5 cmH20  Peak Airway Pressure: 23 cmH2O  Mean Airway Pressure: 8.6 cmH20  Plateau Pressure: 0 cmH20    Temp  Min: 94.5 °F (34.7 °C)  Max: 98.7 °F (37.1 °C)  Pulse  Min: 82  Max: 132  BP  Min: 64/37  Max: 137/82  MAP (mmHg)  Min: 46  Max: 107  Resp  Min: 12  Max: 26  SpO2  Min: 94 %  Max: 100 %  Oxygen Concentration (%)  Min: 40  Max: 80    08/07 0701 - 08/08 0700  In: 2521.1 [I.V.:515.1]  Out: 2800 [Urine:2800]   Unmeasured Output  Stool Occurrence: 0  Emesis Occurrence: 0  Pad Count: 0    Nutrition Prescription Ordered  Current Diet Order: NPO  Last Bowel Movement: 08/07/20    Body mass index is 33.63 kg/m².      I have personally reviewed all labs, imaging, and studies today      No pupillary response  No corneal response  No occulocephalic response  Cold calorics negative   No Cough   No Gag    Apnea test performed at normothermia, O2 via NC into ETT,  PCO2 at 0, 4, 8 minutes: 49,66,79      Examination consistent with brain death    8/8/2020 16:29    Condolences given to Family at bedside    Carlos Manuel Lees MD MPH  Neurocritical Care

## 2020-08-08 NOTE — HPI
34-year-old female, transfer to [facility] ER for ICH management. She originally presented to to [facility] ER  Via EMS after being found down unrepsonve at home.  No significant past medical history is known about this patient.  What is known is 2 weeks ago she developed a headache and was seen at an outside facility.  A CT scan was negative for acute intracranial process.  Tonight the patient was at home in her normal state of health when fell and was found down unresponsive,. EMS arrived and in route to  outside facility she had a PEA cardiac arrest about 10 min before  ROSC, at which pouit she was intubated, around 1730.  She had a  had a subsequent PEA arrest when she arrived in the ED at the outside facility, which lasted about 5-7min.  A CT scan that showed a large cerebral bleed with subarachnoid component. Transfer center contacted and tranfers to [facility] NCC accepted, recs for Mannitol given. Mannitol 100gm was given about 1930. Patient arrived to [facility] ER, GCS 3T, on vent. TOF performed, 4/4 twitches on brow setting 2, to R/o any paralytic that may have been left from intubation earlier.  Exam very poor, concerns for herniation,. CTA with herniation and hydrocephalus. NSGY consulted, STAT.  Given poor exam, herniation present, no NSGY option offered. Awaiting family to discuss prognosis.

## 2020-08-08 NOTE — H&P
[facility]  Neurocritical Care  History & Physical    Admit Date: 8/7/2020  Service Date: 08/07/2020  Length of Stay: 0    Subjective:     Chief Complaint: Cerebellar hemorrhage    History of Present Illness: 34-year-old female, transfer to [facility] ER for ICH management. She originally presented to to [facility] ER  Via EMS after being found down unrepsonve at home.  No significant past medical history is known about this patient.  What is known is 2 weeks ago she developed a headache and was seen at an outside facility.  A CT scan was negative for acute intracranial process.  Tonight the patient was at home in her normal state of health when fell and was found down unresponsive,. EMS arrived and in route to  outside facility she had a PEA cardiac arrest about 10 min before  ROSC, at which pouit she was intubated, around 1730.  She had a  had a subsequent PEA arrest when she arrived in the ED at the outside facility, which lasted about 5-7min.  A CT scan that showed a large cerebral bleed with subarachnoid component. Transfer center contacted and tranfers to [facility] NCC accepted, recs for Mannitol given. Mannitol 100gm was given about 1930. Patient arrived to [facility] ER, GCS 3T, on vent. TOF performed, 4/4 twitches on brow setting 2, to R/o any paralytic that may have been left from intubation earlier.  Exam very poor, concerns for herniation,. CTA with herniation and hydrocephalus. NSGY consulted, STAT.  Given poor exam, herniation present, no NSGY option offered. Awaiting family to discuss prognosis.        No past medical history on file.  No past surgical history on file.   No current facility-administered medications on file prior to encounter.      No current outpatient medications on file prior to encounter.      Allergies: Patient has no known allergies.    No family history on file.  Social History     Tobacco Use    Smoking status: Not on file   Substance Use Topics    Alcohol use: Not on file     Drug use: Not on file     Review of Systems   Unable to perform ROS: Patient unresponsive     Objective:     Vitals:    Temp: 97.6 °F (36.4 °C)  Pulse: 110  BP: 116/64  MAP (mmHg): 84  Resp: (!) 22  SpO2: 100 %  Oxygen Concentration (%): 80  O2 Device (Oxygen Therapy): ventilator  Vent Mode: A/C  Set Rate: 22 BPM  Vt Set: 450 mL  Pressure Support: 0 cmH20  PEEP/CPAP: 5 cmH20  Peak Airway Pressure: 25 cmH2O  Mean Airway Pressure: 11 cmH20  Plateau Pressure: 0 cmH20    Temp  Min: 97.6 °F (36.4 °C)  Max: 98 °F (36.7 °C)  Pulse  Min: 82  Max: 132  BP  Min: 71/48  Max: 128/88  MAP (mmHg)  Min: 84  Max: 90  Resp  Min: 16  Max: 22  SpO2  Min: 94 %  Max: 100 %  Oxygen Concentration (%)  Min: 80  Max: 80    No intake/output data recorded.           Physical Exam  Vitals signs and nursing note reviewed.   Cardiovascular:      Rate and Rhythm: Tachycardia present.      Pulses: Normal pulses.   Pulmonary:      Effort: Pulmonary effort is normal.   Abdominal:      General: Bowel sounds are normal.      Palpations: Abdomen is soft.   Skin:     Capillary Refill: Capillary refill takes 2 to 3 seconds.   Neurological:      Comments: E1 V1t M1  Right pupil 3mm, irregular and fixed/ left pupil 2mm reactive  No cough, gag or corneal   No motor response to pain all extremities  Not breathing over vent, rate decrease to 1           Today I personally reviewed pertinent medications, lines/drains/airways, imaging, cardiology results, laboratory results, microbiology results,       Assessment/Plan:     Neuro  Radha coma scale total score 3-8  GCS 3T  Herniation on CTA    Brain herniation  Acute downward herniation  Mannitol 100gm given priot to arrival  Exam poor, no NSGY option offered.      Cerebellar hemorrhage  Found unrepsonsive and code x 2 atOSH, prior to arrival to [facility]CC  CTH with right cerebellar hemorrhage and sah at OSH,   exam on presentation to [facility] ER,  very poor, GCS 3T, concerns for herniation  STAT CTA with increased  Right cerebellar hemorrhage and downward herniation.   NSGY consulted, after review of images and patient exam, no NSGY option offered.  Patient has also had 2 cardiac arrest prior to arrival.   Patient has poor prognosis overall, and will no meaningful recovery and possibly death. Concerns she is progressing toward   brain death.   Awaiting family to discuss prognosis and goals.     Pulmonary  Acute respiratory failure  intubated at OSH during cardiac arrest  Vent adjusted to decrease CO2  Will wean Fio2 as able    Cardiac/Vascular  Cardiac arrest  PEA arrest x 2 at OSH  1st arrest ROSC with 10 min  2nd Arrest ROSC within 7 min  Given poor exam, overall prognosis in addition ot intracranial bleed, no hypothermia protocol initiated.           The patient is being Prophylaxed for:  Venous Thromboembolism with: Mechanical  Stress Ulcer with: Not Applicable   Ventilator Pneumonia with: chlorhexidine oral care    Activity Orders          None        No Order   Critical Care Time: 80 min.     Everardo Caldera NP  Neurocritical Care  [facility]

## 2020-08-08 NOTE — CONSULTS
"  [facility]  Adult Nutrition  Consult Note    SUMMARY     Recommendations    Recommendation:   1. If TF warranted, suggest Impact peptide @ 10 mL/hr increase to goal rate of 35 mL/hr to provide pt with 1260 kcal, 79 g protein and 647 mL free water.    -Additional water per MD. Hold for residuals >500 mL.   2. RD to monitor and follow up    Goals: pt to receive nutrition by RD follow up  Nutrition Goal Status: new  Communication of RD Recs: other (comment)(POC)    Reason for Assessment    Reason For Assessment: consult  Diagnosis: (Cerebellar hemorrhage)  Relevant Medical History: none  Interdisciplinary Rounds: did not attend  General Information Comments: Pt intubated on vent at this time, no sedation per chart. Team in room, no family at bedside. OG in place. DAPHNIE nutrition hx and no recent wt hx in chart. Per chart review, awaiting family decision on care. Unable to complete NFPE at this time, pt with no indications of malnutrition currently.  Nutrition Discharge Planning: unable to determine    Nutrition Risk Screen    Nutrition Risk Screen: dysphagia or difficulty swallowing    Nutrition/Diet History    Food Allergies: NKFA  Factors Affecting Nutritional Intake: on mechanical ventilation, NPO    Anthropometrics    Temp: 97.2 °F (36.2 °C)  Height Method: Measured  Height: 5' 6" (167.6 cm)  Height (inches): 66 in  Weight Method: Bed Scale  Weight: 94.5 kg (208 lb 5.4 oz)  Weight (lb): 208.34 lb  Ideal Body Weight (IBW), Female: 130 lb  % Ideal Body Weight, Female (lb): 160.26 %  BMI (Calculated): 33.6  BMI Grade: 30 - 34.9- obesity - grade I       Lab/Procedures/Meds    Pertinent Labs Reviewed: reviewed  Pertinent Labs Comments: Glucose 208  Pertinent Medications Reviewed: reviewed  Pertinent Medications Comments: famotidine; metoprolol    Estimated/Assessed Needs    Weight Used For Calorie Calculations: 94.5 kg (208 lb 5.4 oz)  Energy Calorie Requirements (kcal): 6545-2295 kcal/d  Energy Need " Method: Kcal/kg  Protein Requirements:  g/day(1.5-2.0 g/kg IBW)  Weight Used For Protein Calculations: 59 kg (130 lb)  Fluid Requirements (mL): 1 mL/kcal or per MD  RDA Method (mL): 1039    Nutrition Prescription Ordered    Current Diet Order: NPO    Evaluation of Received Nutrient/Fluid Intake    I/O: -2. L since admit  Energy Calories Required: not meeting needs  Protein Required: not meeting needs  Fluid Required: other (see comments)  Comments: LBM 8/7  Tolerance: not tolerating  % Intake of Estimated Energy Needs: 0 - 25 %  % Meal Intake: NPO    Nutrition Risk    Level of Risk/Frequency of Follow-up: high     Assessment and Plan  Nutrition Problem  inadequate energy intake    Related to (etiology):   NPO 2/2 intubated     Signs and Symptoms (as evidenced by):   NPO with no means of nutrition     Interventions (treatment strategy):  Collaboration of care with other providers    Nutrition Diagnosis Status:   New    Monitor and Evaluation    Food and Nutrient Intake: enteral nutrition intake, energy intake  Food and Nutrient Adminstration: enteral and parenteral nutrition administration, diet order  Anthropometric Measurements: weight, weight change  Biochemical Data, Medical Tests and Procedures: electrolyte and renal panel, gastrointestinal profile, glucose/endocrine profile, inflammatory profile, lipid profile  Nutrition-Focused Physical Findings: overall appearance     Nutrition Follow-Up    RD Follow-up?: Yes

## 2020-08-08 NOTE — PLAN OF CARE
Recommendations    Recommendation:   1. If TF warranted, suggest Impact peptide @ 10 mL/hr increase to goal rate of 35 mL/hr to provide pt with 1260 kcal, 79 g protein and 647 mL free water.    -Additional water per MD. Hold for residuals >500 mL.   2. RD to monitor and follow up    Goals: pt to receive nutrition by RD follow up  Nutrition Goal Status: new  Communication of RD Recs: other (comment)(POC)

## 2020-08-08 NOTE — ASSESSMENT & PLAN NOTE
NSGY no intervention offered  Grave prognosis  Family updated  CPP goal > 50 if able  Brain death exam today

## 2020-08-08 NOTE — ASSESSMENT & PLAN NOTE
intubated at OSH during cardiac arrest  Vent adjusted to decrease CO2  Will wean Fio2 as able  Repeat ABG reviewed  PCO2 goal 30-35 in Am then to 40 prior to Brain death apnea test

## 2020-08-08 NOTE — ASSESSMENT & PLAN NOTE
Acute downward herniation  Mannitol 100gm given priot to arrival  Exam poor, no NSGY option offered.

## 2020-08-08 NOTE — NURSING
BP cuff reading 56/32 (40) with IVF bolus going. Sheard NP aware and order to give sumanth bump, start another 1L bolus, and hang Levo gtt.    x1 sumanth bump and BP 64/37 (46)  x2 sumanth bumps and BP 66/37 (47)    Sheard NP placing emergent arterial line; x3 more sumanth bumps given. Levo gtt 0.12 mcg/kg/min SBP reading 115 with MAPs 90s.

## 2020-08-08 NOTE — HPI
34 F with unknown PMHX presents GCS 3T s/p 2x PEA arrest with CTH revealing R cerebellar hemorrhage, subsequent scan with tonsilar herniation w/associated obstructive hydrocephalus. Reportedly 2 weeks ago she developed a headache and was seen at an outside facility, negative CTH at this time.  Per report, tonight the patient was at home in her normal state of health when she syncopized.  En route to the outside facility she had a PEA cardiac arrest. ROSC was achieved in the field and then she had a subsequent PEA arrest when she arrived in the ED at [place].  CTH at OSH with R cerebellar hemorrhage with some mass effect on 4th ventricle and early hydrocephalus. Given 100g Mannitol at OSH and transferred to [facility]     Upon arrival to our facility the patient is on no drips and no sedation.  She has a GCS of 3.

## 2020-08-08 NOTE — ED PROVIDER NOTES
Encounter Date: 8/7/2020       History     Chief Complaint   Patient presents with    [facility] transfer     nontraumatic SAH. Intubated      34-year-old female to the emergency department as a transfer from [place].  No significant past medical history is known about this patient.  What is known is 2 weeks ago she developed a headache and was seen at an outside facility.  A CT scan was negative for acute intracranial process.  Tonight the patient was at home in her normal state of health when she syncopized.  And route to the outside facility she had a PEA cardiac arrest. ROSC  Gotten in the field and then she had a subsequent PEA arrest when she arrived in the ED at the outside facility.  A CT scan that showed a large cerebral bleed with subarachnoid component.      She was given mannitol and transferred here.    Upon arrival to our facility the patient is on no drips and no sedation.  She has a GCS of 3.        Review of patient's allergies indicates:  Not on File  No past medical history on file.  No past surgical history on file.  No family history on file.  Social History     Tobacco Use    Smoking status: Not on file   Substance Use Topics    Alcohol use: Not on file    Drug use: Not on file     Review of Systems   Unable to perform ROS: Intubated       Physical Exam     Initial Vitals [08/07/20 2108]   BP Pulse Resp Temp SpO2   128/88 82 (!) 22 98 °F (36.7 °C) 98 %      MAP       --         Physical Exam    Constitutional: Vital signs are normal. She appears well-developed and well-nourished.   HENT:   Head: Normocephalic and atraumatic.   Right Ear: Hearing normal.   Left Ear: Hearing normal.   Eyes: Conjunctivae are normal.   Cardiovascular: Normal rate and regular rhythm.   Abdominal: Soft. Normal appearance and bowel sounds are normal.   Neurological:    Intubated   right pupil 5 mm and fixed   Left pupil 2 mm and  Fixed   no corneal reflex, no gag reflex, not withdrawing   Skin: Skin is warm and  intact.         ED Course   Critical Care    Date/Time: 8/7/2020 9:49 PM  Performed by: Everardo Morgan PA-C  Authorized by: John Paul Agustin Jr., MD   Direct patient critical care time: 15 minutes  Additional history critical care time: 10 minutes  Ordering / reviewing critical care time: 10 minutes  Documentation critical care time: 10 minutes  Consulting other physicians critical care time: 10 minutes  Total critical care time (exclusive of procedural time) : 55 minutes  Critical care was necessary to treat or prevent imminent or life-threatening deterioration of the following conditions: CNS failure or compromise.  Critical care was time spent personally by me on the following activities: review of old charts, pulse oximetry, ventilator management, re-evaluation of patient's condition and examination of patient.        Labs Reviewed   SARS-COV-2 RNA AMPLIFICATION, QUAL   CBC W/ AUTO DIFFERENTIAL   COMPREHENSIVE METABOLIC PANEL          Imaging Results          CTA Brain (In process)  Result time 08/07/20 21:45:24                 Medical Decision Making:   History:   Old Medical Records: I decided to obtain old medical records.  Old Records Summarized: records from another hospital.  Initial Assessment:    34-year-old female with a large head bleed  Differential Diagnosis:    ICH, herniation, subarachnoid bleed  Clinical Tests:   Lab Tests: Ordered and Reviewed  Radiological Study: Ordered and Reviewed  ED Management:   Plan:   patient emergently brought to ED bed 2.  Neurosurgery and neurocritical care contacted and both at the bedside within 5 min.    we will get a CTA.   ET tube appears to be  Appropriately positioned.  To the patient's blood pressure is 120 systolic.  1145pm  NCC will admit the pt. Awaiting on family arrival, pt has minimal brain activity based on physical exam and a detrimental head bleed. Unlikely that she will be able to recover / survive without life support. We will discuss options  with the family when they arrive.   Other:   I discussed test(s) with the performing physician.  I have discussed this case with another health care provider.                                 Clinical Impression:       ICD-10-CM ICD-9-CM   1. Nontraumatic intracerebral hemorrhage, unspecified cerebral location, unspecified laterality  I61.9 431         Disposition:   Disposition: Admitted  Condition: Critical     ED Disposition Condition    Admit                           Everardo Morgan PA-C  08/08/20 0000

## 2020-08-08 NOTE — ED TRIAGE NOTES
"Patient arrives via [facility] as transfer from [facility]. Per flight RN, patient has been having "migraines" for 2wks. Had an unwitnessed syncopal episode at home today while having a bm. Mother called 911. Patient became unresponsive during ems transport to [facility] ER. Patient then went into PEA. ROSC was achieved by ems after approx 4 min. Patient then went PEA in ER and ROSC was achieved after approx 5min. CT showed R cerebellar hemorrhage/SAH. Patient was given 1g Mannitol at [facility] at 1920. Patient arrives to [facility] ED intubated and on no sedation or gtt. Patient has GCS 3 with R pupil 4mm, non-reactive, and irregular. [facility] contacted upon patient's arrival. Neuro CC and ED provider at bedside. Mother, Nemo Babin, in route to ED per flight care RN. Approx ETA 2hrs. Per medical records from transfer hospital, patient has no PMHx, does not take any Rx, and has NKDA.  "

## 2020-08-08 NOTE — ASSESSMENT & PLAN NOTE
34 F with unknown PMHx presents with R cerebellar hemorrhage (ICHS 3) s/p 2 PEA arrests:    No acute neurosurgical intervention  Patient with very poor prognosis, GCS 3T on exam with L pupil reactivity otherwise no brain stem reflexes. Tonsillar herniation w/obstructive hydrocephalus on repeat CT obtained on arrival.  No neurosurgical intervention would provide meaningful improvement in patient's quality of life or prognosis.  NSGY will sign off. Rec GOC conversation with family, can be present for discussion if needed. Continue NCC care pending family discussion.    D/w Dr. Ortez

## 2020-08-08 NOTE — CONSULTS
Inpatient consult to Physical Medicine Rehab  Consult performed by: Yvonne Pineda NP  Consult ordered by: Everardo Caldera NP  Reason for consult: Assess rehab needs      Patient is too acute at this time; rehab potential cannot be appropriately assessed.  Recommend early mobility, OOB in chair, and PT/OT/SLP when appropriate.  Will follow for additional rehab needs and post-acute recommendation when patient is medically stable and able to participate with therapy.    Thank you for consult.    KATIE Tee, FNP-C  Physical Medicine & Rehabilitation   08/08/2020

## 2020-08-08 NOTE — PROGRESS NOTES
[facility]  Neurocritical Care  Progress Note    Admit Date: 8/7/2020  Service Date: 08/08/2020  Length of Stay: 1    Subjective:     Chief Complaint: Cerebellar hemorrhage    History of Present Illness: 34-year-old female, [place] [facility] [place]. She originally presented to to [facility] ER  Via EMS after being found down unrepsonve at home.  No significant past medical history is known about this patient.  What is known is 2 weeks ago she developed a headache and was seen at an outside facility.  A CT scan was negative for acute intracranial process.  Tonight the patient was at home in her normal state of health when fell and was found down unresponsive,. EMS arrived and in route to  outside facility she had a PEA cardiac arrest about 10 min before  ROSC, at which pouit she was intubated, around 1730.  She had a  had a subsequent PEA arrest when she arrived in the ED at the outside facility, which lasted about 5-7min.  A CT scan that showed a large cerebral bleed with subarachnoid component. Transfer center contacted and [place] [facility] [place], recs for Mannitol given. Mannitol 100gm was given about 1930. Patient [place] [facility] [place], GCS 3T, on vent. TOF performed, 4/4 twitches on brow setting 2, to R/o any paralytic that may have been left from intubation earlier.  Exam very poor, concerns for herniation,. CTA with herniation and hydrocephalus. NSGY consulted, STAT.  Given poor exam, herniation present, no NSGY option offered. Awaiting family to discuss prognosis.        Hospital Course: 8/7: transfer from OSH, GCS 3T, intubated, exam very poor, out of porportio to previous [facility]. NSGY consulted, CTA pending  8/8: imaging reviewed, family updated at bedside, brain death exam today    Review of Symptoms:   Unable to obtain, intubated, neuro-exam    Physical Exam:  GA: comfortable, no acute distress.   HEENT: No scleral icterus or JVD.   Pulmonary: Clear to auscultation A/L. No  wheezing, crackles, or rhonchi. intubated  Cardiac: RRR S1 & S2 w/o rubs/murmurs/gallops.   Abdominal: Bowel sounds present x 4. No appreciable hepatosplenomegaly.  Skin: No jaundice, rashes, or visible lesions.  Pulses: 1+ Dp bilat    Neuro:  --sedation: none  --GCS: [id number]  --Mental Status: coma, no commands no response   --pupils bilat 5 mm fixed  --no corneals  --no occulocephalics  --no cough  --no gag  --Motor: flaccid throughout  --sensory: see motor  --Reflexes: not tested  --Gait: deferred    Recent Labs   Lab 08/08/20  0205   WBC 21.57*   RBC 4.83   HGB 13.5   HCT 41.9   *   MCV 87   MCH 28.0   MCHC 32.2     Recent Labs   Lab 08/08/20  0205 08/08/20  0454   CALCIUM 8.9  --    PROT 7.8  --    * 145   K 3.9  --    CO2 20*  --    *  --    BUN 10  --    CREATININE 0.8  --    ALKPHOS 89  --    ALT 55*  --    AST 32  --    BILITOT 0.4  --      Recent Labs   Lab 08/08/20  0205   INR 0.9   APTT 29.8     Vent Mode: A/C  Oxygen Concentration (%):  [40-80] 40  Resp Rate Total:  [16 br/min-26 br/min] 16 br/min  Vt Set:  [450 mL-480 mL] 480 mL  PEEP/CPAP:  [5 cmH20] 5 cmH20  Pressure Support:  [0 cmH20] 0 cmH20  Mean Airway Pressure:  [9.3 tlK06-21 cmH20] 9.4 cmH20    Temp: 97 °F (36.1 °C)  Pulse: 107  Rhythm: normal sinus rhythm, sinus tachycardia  BP: 96/61  MAP (mmHg): 73  Resp: 16  SpO2: 96 %  Oxygen Concentration (%): 40  O2 Device (Oxygen Therapy): ventilator  Vent Mode: A/C  Set Rate: 16 BPM  Vt Set: 480 mL  PEEP/CPAP: 5 cmH20  Peak Airway Pressure: 24 cmH2O  Mean Airway Pressure: 9.4 cmH20  Plateau Pressure: 0 cmH20    Temp  Min: 94.5 °F (34.7 °C)  Max: 98.7 °F (37.1 °C)  Pulse  Min: 82  Max: 132  BP  Min: 64/37  Max: 137/82  MAP (mmHg)  Min: 46  Max: 107  Resp  Min: 16  Max: 26  SpO2  Min: 94 %  Max: 100 %  Oxygen Concentration (%)  Min: 40  Max: 80    08/07 0701 - 08/08 0700  In: 2521.1 [I.V.:515.1]  Out: 2800 [Urine:2800]   Unmeasured Output  Stool Occurrence: 0  Emesis Occurrence: 0  Pad  Count: 0    Nutrition Prescription Ordered  Current Diet Order: NPO  Last Bowel Movement: 08/07/20    Body mass index is 33.63 kg/m².      I have personally reviewed all labs, imaging, and studies today      Assessment/Plan:     Neuro  * Cerebellar hemorrhage  NSGY no intervention offered  Grave prognosis  Family updated  CPP goal > 50 if able  Brain death exam today     [place] coma scale total score 3-8  GCS 3T  Herniation on CTA    Brain herniation  Acute downward herniation  Mannitol 100gm given priot to arrival  Exam poor, no NSGY option offered.      Pulmonary  Acute respiratory failure with hypoxia and hypercapnia  intubated at OSH during cardiac arrest  Vent adjusted to decrease CO2  Will wean Fio2 as able  Repeat ABG reviewed  PCO2 goal 30-35 in Am then to 40 prior to Brain death apnea test    Cardiac/Vascular  Cardiac arrest  PEA arrest x 2 at OSH  1st arrest ROSC with 10 min  2nd Arrest ROSC within 7 min  Given poor exam, overall prognosis in addition ot intracranial bleed, no hypothermia protocol initiated.           The patient is being Prophylaxed for:  Venous Thromboembolism with: Mechanical  Stress Ulcer with: H2B  Ventilator Pneumonia with: chlorhexidine oral care    Activity Orders          Commode at bedside starting at 08/07 2334    Diet NPO: NPO starting at 08/07 2334        Partial Code    Carlos Manuel Lees MD  Neurocritical Care  [facility]    43     minutes of Critical care time was spent personally by me on the following activities: development of treatment plan with patient or surrogate and bedside caregivers, discussions with consultants, evaluation of patient's response to treatment, examination of patient, ordering and performing treatments and interventions, ordering and review of laboratory studies, ordering and review of radiographic studies, pulse oximetry, antibiotic titration if applicable, vasopressor titration if applicable, re-evaluation of patient's  condition. This critical care time did not overlap with that of any other provider or involve time for any procedures. There is potential for acute life threatening neurological and or medical decompensation therefore will continue to monitor closely. Current critical conditions posing threat to organ systems, limb, or life include:  Cerebellar hemorrhage, brain edema, brain compression/herniation, respiratory failure, COMA, cardiac arrest

## 2020-08-08 NOTE — SIGNIFICANT EVENT
Family arrived, NSGY and I discussed series of events, exam, imaging and overall prognosis. We discussed the high probability of impending brain herniation, possibility of brain death or permanent vegetative state if Niecy survives.      The mother and family asked what options were available, and voiced she would not want her daughter in permanent coma.   We explained that while there is no surgical option, we could continue with medical management, with the understanding that patient will probably be in permanent vegetative state if she survived. We also dicussed comfort care and the process involved.   In addition I covered the DNR/Partial code status.      The family discussed amongst themselves. The decided to make the patient a DNR and to look at withdrawing care later in the morning when the other family would be able to attend.     Everardo Caldera [facility]  NeuroCritical Care

## 2020-08-08 NOTE — PLAN OF CARE
POC reviewed with pt mother, Bonnie, at 0500. Mother verbalized understanding. Questions and concerns addressed. No acute events overnight. Pt progressing toward goals. Will continue to monitor. See flowsheets for full assessment and VS info.    NS @ 75cc  Levo gtt for MAPs > 65  Vaso 0.01 for increased UOP  Bare hugger for low temp  Arterial Line placed  SUSY following

## 2020-08-08 NOTE — SUBJECTIVE & OBJECTIVE
(Not in a hospital admission)      Review of patient's allergies indicates:  No Known Allergies    No past medical history on file.  No past surgical history on file.  Family History     None        Tobacco Use    Smoking status: Not on file   Substance and Sexual Activity    Alcohol use: Not on file    Drug use: Not on file    Sexual activity: Not on file     Review of Systems   Unable to perform ROS: Patient unresponsive     Objective:     Weight: 98 kg (216 lb 0.8 oz)  There is no height or weight on file to calculate BMI.  Vital Signs (Most Recent):  Temp: 97.6 °F (36.4 °C) (08/07/20 2115)  Pulse: 110 (08/07/20 2149)  Resp: (!) 22 (08/07/20 2108)  BP: 116/64 (08/07/20 2149)  SpO2: 100 % (08/07/20 2149) Vital Signs (24h Range):  Temp:  [97.6 °F (36.4 °C)-98 °F (36.7 °C)] 97.6 °F (36.4 °C)  Pulse:  [] 110  Resp:  [16-22] 22  SpO2:  [94 %-100 %] 100 %  BP: ()/(48-88) 116/64                Vent Mode: A/C  Oxygen Concentration (%):  [80] 80  Resp Rate Total:  [22 br/min] 22 br/min  Vt Set:  [450 mL] 450 mL  PEEP/CPAP:  [5 cmH20] 5 cmH20  Pressure Support:  [0 cmH20] 0 cmH20  Mean Airway Pressure:  [11 cmH20] 11 cmH20         NG/OG Tube 08/07/20 orogastric Center mouth (Active)            Urethral Catheter 08/07/20 Latex (Active)       Neurosurgery Physical Exam   Intubated, off sedation  R pupil 5mm fixed  L pupil 2-3mm, not reliably reactive to penlight, reactive via pupilometer  No cough/gag  No corneals to gauze/saline drops  No movement to central or peripheral stimulus  No spontaneous movement    Significant Labs:  No results for input(s): GLU, NA, K, CL, CO2, BUN, CREATININE, CALCIUM, MG in the last 48 hours.  No results for input(s): WBC, HGB, HCT, PLT in the last 48 hours.  No results for input(s): LABPT, INR, APTT in the last 48 hours.  Microbiology Results (last 7 days)     ** No results found for the last 168 hours. **          Significant Diagnostics:  CTH from OSH reviewed, per HPI  CTA  on arrival with R cerebellar hemorrhage, tonsilar herniation and obstructive hydrocephalus

## 2020-08-08 NOTE — PLAN OF CARE
Brain death studies performed by MD Yoana. SUSY has been at the bedside all day meeting patient families needs. Levo gtt infusing. GCS 3 and no reflexes present. POC reviewed with family at 1630.  Family verbalized understanding. Questions and concerns addressed. See flowsheets for full assessment and VS info. Transitioning care to SUSY.

## 2020-08-08 NOTE — ASSESSMENT & PLAN NOTE
PEA arrest x 2 at OSH  1st arrest ROSC with 10 min  2nd Arrest ROSC within 7 min  Given poor exam, overall prognosis in addition ot intracranial bleed, no hypothermia protocol initiated.

## 2020-08-08 NOTE — CARE UPDATE
Patient a transfer via EMS on portable vent.  Patient has a 7.0 ETT taped midline 23cm at lip.  Patient placed on  vent with the following settings: AC 22/450/+5/80%. Patient tolerating well. No distress noted. Alarms set and functioning. Ambu bag and mask at [facility]. Will continue to monitor.

## 2020-08-08 NOTE — CHAPLAIN
Spiritual Care Encounter   Please contact the Department of Spiritual Care, your Unit  or the On-call  at any time if any needs are brought to your attention. i.e. Anytime you note spiritual, existential or emotional distress or there is a new diagnosis and/or prognosis.      Purpose:  Visit Type: Initial Visit  Visit Category: Critical Care  Visited With: Patient and family together  Number of Family Visited: 1(patient's mother)  Length of Visit: 30 Minutes  Continue Visiting: Yes  Referral From: Nurse  Referral To:              Encounter Summary:          Patient Spiritual Encounters  Care Provided: Compassionate presence  Patient Coping: Non-verbal   Family Spiritual Encounters  Care Provided: Reflective listening, Life review/ reflection, Compassionate presence, Explored pt/family concerns  Family Coping: Accepting, Sadness, Family/ friends resources, Counseling/ support group        No Update to Plan of Care/Goals Needed at This Time    Follow up will be completed, as needed, for the spiritual and emotional support of the patient and/or their family/support(s). The patient and/or their family/support(s) were informed of how to contact the Spiritual Care Department as well as our availability and functions.  There will be continued thoughts of peace over the patient and their loved ones.       Darlene Archuleta  Chaplain Resident  On-Call 24/7: #[phone]  Department of Spiritual Care - [facility]

## 2020-08-08 NOTE — PLAN OF CARE
SW completed discharge planning assessment via medical records in consideration of family due to patient having possible brain death and likely removal of care today.     BLANCA Aguilar    Payor: MEDICAID / Plan: [facility] ([place] MEDICAID) / Product Type: Managed Medicaid /          08/08/20 1404   Discharge Assessment   Assessment Type Discharge Planning Assessment   Assessment information obtained from? Medical Record   Prior to hospitilization cognitive status: Alert/Oriented   Prior to hospitalization functional status: Independent   Current cognitive status: Coma/Sedated/Intubated   Current Functional Status: Completely Dependent   Facility Arrived From: [facility]   Able to Return to Prior Arrangements no   Is patient able to care for self after discharge? No   Who are your caregiver(s) and their phone number(s)? Nemo Babin (mother) [phone]   Readmission Within the Last 30 Days no previous admission in last 30 days   Patient currently being followed by outpatient case management? No   Patient currently receives any other outside agency services? No   Equipment Currently Used at Home none   Discharge Plan A Other   Patient/Family in Agreement with Plan unable to assess     Lucy Mejia LMSW   - Case Management

## 2020-08-08 NOTE — PT/OT/SLP PROGRESS
Speech Language Pathology      Niecy Sevilla  MRN: [id number]    Patient not seen today secondary to Patient intubated. ST to d/c orders as Pt will require new orders when appropriate for evaluation. Please reconsult when appropriate. Thank you.     ALYSIA Rojo., [facility]-SLP  Speech-Language Pathology  Pager: [phone]  8/8/2020

## 2020-08-08 NOTE — ASSESSMENT & PLAN NOTE
Found unrepsonsive and code x 2 atOSH, prior to arrival to [facility]  CTH with right cerebellar hemorrhage and sah at OSH,   exam on presentation to [facility] ER,  very poor, GCS 3T, concerns for herniation  STAT CTA with increased Right cerebellar hemorrhage and downward herniation.   NSGY consulted, after review of images and patient exam, no NSGY option offered.  Patient has also had 2 cardiac arrest prior to arrival.   Patient has poor prognosis overall, and will no meaningful recovery and possibly death. Concerns she is progressing toward   brain death.   Awaiting family to discuss prognosis and goals.

## 2020-08-08 NOTE — PROCEDURES
"Niecy Sevilla is a 34 y.o. female patient.    Temp: 96.8 °F (36 °C) (08/08/20 0705)  Pulse: 110 (08/08/20 0705)  Resp: (!) 22 (08/08/20 0705)  BP: 104/63 (08/08/20 0705)  SpO2: 99 % (08/08/20 0705)  Weight: 94.5 kg (208 lb 5.4 oz) (08/08/20 0015)  Height: 5' 6" (167.6 cm) (08/08/20 0325)       Arterial Line    Date/Time: 8/8/2020 7:21 AM  Location procedure was performed: [facility] NEURO CRITICAL CARE  Performed by: Everardo Caldera NP  Authorized by: Everardo Caldera NP   Consent Done: Emergent Situation  Preparation: Patient was prepped and draped in the usual sterile fashion.  Indications: multiple ABGs, respiratory failure and hemodynamic monitoring  Location: right radial  Gonzalo's test normal: yes  Needle gauge: 20  Number of attempts: 1  Complications: No  Specimens: No  Implants: No  Post-procedure: dressing applied  Post-procedure CMS: normal and unchanged  Patient tolerance: Patient tolerated the procedure well with no immediate complications      time spent on procedure I addition to critical care time     Everardo Caldera  8/8/2020  "

## 2020-08-08 NOTE — SUBJECTIVE & OBJECTIVE
No past medical history on file.  No past surgical history on file.   No current facility-administered medications on file prior to encounter.      No current outpatient medications on file prior to encounter.      Allergies: Patient has no known allergies.    No family history on file.  Social History     Tobacco Use    Smoking status: Not on file   Substance Use Topics    Alcohol use: Not on file    Drug use: Not on file     Review of Systems   Unable to perform ROS: Patient unresponsive     Objective:     Vitals:    Temp: 97.6 °F (36.4 °C)  Pulse: 110  BP: 116/64  MAP (mmHg): 84  Resp: (!) 22  SpO2: 100 %  Oxygen Concentration (%): 80  O2 Device (Oxygen Therapy): ventilator  Vent Mode: A/C  Set Rate: 22 BPM  Vt Set: 450 mL  Pressure Support: 0 cmH20  PEEP/CPAP: 5 cmH20  Peak Airway Pressure: 25 cmH2O  Mean Airway Pressure: 11 cmH20  Plateau Pressure: 0 cmH20    Temp  Min: 97.6 °F (36.4 °C)  Max: 98 °F (36.7 °C)  Pulse  Min: 82  Max: 132  BP  Min: 71/48  Max: 128/88  MAP (mmHg)  Min: 84  Max: 90  Resp  Min: 16  Max: 22  SpO2  Min: 94 %  Max: 100 %  Oxygen Concentration (%)  Min: 80  Max: 80    No intake/output data recorded.           Physical Exam  Vitals signs and nursing note reviewed.   Cardiovascular:      Rate and Rhythm: Tachycardia present.      Pulses: Normal pulses.   Pulmonary:      Effort: Pulmonary effort is normal.   Abdominal:      General: Bowel sounds are normal.      Palpations: Abdomen is soft.   Skin:     Capillary Refill: Capillary refill takes 2 to 3 seconds.   Neurological:      Comments: E1 V1t M1  Right pupil 3mm, irregular and fixed/ left pupil 2mm reactive  No cough, gag or corneal   No motor response to pain all extremities  Not breathing over vent, rate decrease to 1           Today I personally reviewed pertinent medications, lines/drains/airways, imaging, cardiology results, laboratory results, microbiology results,

## 2020-08-08 NOTE — HOSPITAL COURSE
8/7: transfer from OSH, GCS 3T, intubated, exam very poor, out of porportio to previous CTH. NSGY consulted, CTA pending  8/8: imaging reviewed, family updated at bedside, brain death exam today

## 2020-08-08 NOTE — PT/OT/SLP PROGRESS
Physical Therapy      Patient Name:  Niecy Sevilla   MRN:  [id number]    Patient not seen today secondary to Other (Comment)(intubated, unstable medical status with poor prognosis). Patient is not appropriate to mobilize with PT at this time due to medical instability. Discharged PT orders. Please re-consult if patient should become appropriate for out of bed mobility.     Trini Pastor, PT

## 2020-08-08 NOTE — CONSULTS
[facility]  Neurosurgery  Consult Note    Inpatient consult to Neurosurgery  Consult performed by: Roxi Reza MD  Consult ordered by: Everardo Morgan PA-C  Reason for consult: cerebellar hemorrhage        Subjective:     Chief Complaint/Reason for Admission: AMS    History of Present Illness: 34 F with unknown PMHX presents GCS 3T s/p 2x PEA arrest with CTH revealing R cerebellar hemorrhage, subsequent scan with tonsilar herniation w/associated obstructive hydrocephalus. Reportedly 2 weeks ago she developed a headache and was seen at an outside facility, negative CTH at this time.  Per report, tonight the patient was at home in her normal state of health when she syncopized.  En route to the outside facility she had a PEA cardiac arrest. ROSC was achieved in the field and then she had a subsequent PEA arrest when she arrived in the ED at [place].  CTH at OSH with R cerebellar hemorrhage with some mass effect on 4th ventricle and early hydrocephalus. Given 100g Mannitol at OSH and transferred to [facility]     Upon arrival to our facility the patient is on no drips and no sedation.  She has a GCS of 3.    (Not in a hospital admission)      Review of patient's allergies indicates:  No Known Allergies    No past medical history on file.  No past surgical history on file.  Family History     None        Tobacco Use    Smoking status: Not on file   Substance and Sexual Activity    Alcohol use: Not on file    Drug use: Not on file    Sexual activity: Not on file     Review of Systems   Unable to perform ROS: Patient unresponsive     Objective:     Weight: 98 kg (216 lb 0.8 oz)  There is no height or weight on file to calculate BMI.  Vital Signs (Most Recent):  Temp: 97.6 °F (36.4 °C) (08/07/20 2115)  Pulse: 110 (08/07/20 2149)  Resp: (!) 22 (08/07/20 2108)  BP: 116/64 (08/07/20 2149)  SpO2: 100 % (08/07/20 2149) Vital Signs (24h Range):  Temp:  [97.6 °F (36.4 °C)-98 °F (36.7 °C)]  97.6 °F (36.4 °C)  Pulse:  [] 110  Resp:  [16-22] 22  SpO2:  [94 %-100 %] 100 %  BP: ()/(48-88) 116/64                Vent Mode: A/C  Oxygen Concentration (%):  [80] 80  Resp Rate Total:  [22 br/min] 22 br/min  Vt Set:  [450 mL] 450 mL  PEEP/CPAP:  [5 cmH20] 5 cmH20  Pressure Support:  [0 cmH20] 0 cmH20  Mean Airway Pressure:  [11 cmH20] 11 cmH20         NG/OG Tube 08/07/20 orogastric Center mouth (Active)            Urethral Catheter 08/07/20 Latex (Active)       Neurosurgery Physical Exam   Intubated, off sedation  R pupil 5mm fixed  L pupil 2-3mm, not reliably reactive to penlight, reactive via pupilometer  No cough/gag  No corneals to gauze/saline drops  No movement to central or peripheral stimulus  No spontaneous movement    Significant Labs:  No results for input(s): GLU, NA, K, CL, CO2, BUN, CREATININE, CALCIUM, MG in the last 48 hours.  No results for input(s): WBC, HGB, HCT, PLT in the last 48 hours.  No results for input(s): LABPT, INR, APTT in the last 48 hours.  Microbiology Results (last 7 days)     ** No results found for the last 168 hours. **          Significant Diagnostics:  CTH from OSH reviewed, per HPI  CTA on arrival with R cerebellar hemorrhage, tonsilar herniation and obstructive hydrocephalus      Assessment/Plan:     Cerebellar hemorrhage  34 F with unknown PMHx presents with R cerebellar hemorrhage (ICHS 3) s/p 2 PEA arrests:    No acute neurosurgical intervention  Patient with very poor prognosis, GCS 3T on exam with L pupil reactivity otherwise no brain stem reflexes. Tonsillar herniation w/obstructive hydrocephalus on repeat CT obtained on arrival.  No neurosurgical intervention would provide meaningful improvement in patient's quality of life or prognosis.  NSGY will sign off. Rec GOC conversation with family, can be present for discussion if needed. Continue NCC care pending family discussion.    D/w Dr. Ortez      Thank you for your consult. I will sign off. Please  contact us if you have any additional questions.    Roxi Laughlin MD  Neurosurgery  [facility]

## 2020-08-08 NOTE — CONSULTS
Unfortunately no vascular neurology needs on this patient with extremely poor prognosis and no chance of recover due to no brainstem reflexes

## 2020-08-08 NOTE — LOPA/MORA/SWTA/AOC/AEB
[facility] [facility] ([facility])  On-Site Evaluation  [facility] Contact # [phone]        Thank you for the referral of this patient to determine suitability for organ, tissue, and eye donation.  A chart review has been conducted (date): 08/07/2020  at (time)  22:40 .  [facility] findings are as follows:    ? Potential candidate for organ donation - SUSY following patient. Any changes in patients condition, discussion of withdrawing the vent or brain death exams, or family mention of donation immediately call [phone].    ? Potential for candidate for tissue and eye donation- call SUSY at [phone] within 2 hours of death for screening as a potential tissue and/or eye donor.           [facility] Representative:   Opal Gomez RN/      [facility] Referral Number:    [id number]

## 2020-08-09 LAB
ALBUMIN SERPL BCP-MCNC: 3.2 G/DL (ref 3.5–5.2)
ALBUMIN SERPL BCP-MCNC: 3.4 G/DL (ref 3.5–5.2)
ALBUMIN SERPL BCP-MCNC: 3.4 G/DL (ref 3.5–5.2)
ALBUMIN SERPL BCP-MCNC: 3.9 G/DL (ref 3.5–5.2)
ALLENS TEST: ABNORMAL
ALP SERPL-CCNC: 55 U/L (ref 55–135)
ALP SERPL-CCNC: 57 U/L (ref 55–135)
ALP SERPL-CCNC: 57 U/L (ref 55–135)
ALP SERPL-CCNC: 63 U/L (ref 55–135)
ALT SERPL W/O P-5'-P-CCNC: 28 U/L (ref 10–44)
ALT SERPL W/O P-5'-P-CCNC: 28 U/L (ref 10–44)
ALT SERPL W/O P-5'-P-CCNC: 32 U/L (ref 10–44)
ALT SERPL W/O P-5'-P-CCNC: 34 U/L (ref 10–44)
AMYLASE SERPL-CCNC: 607 U/L (ref 20–110)
AMYLASE SERPL-CCNC: 666 U/L (ref 20–110)
AMYLASE SERPL-CCNC: 666 U/L (ref 20–110)
AMYLASE SERPL-CCNC: 823 U/L (ref 20–110)
ANION GAP SERPL CALC-SCNC: 4 MMOL/L (ref 8–16)
ANION GAP SERPL CALC-SCNC: ABNORMAL MMOL/L (ref 8–16)
APTT BLDCRRT: 26.5 SEC (ref 21–32)
APTT BLDCRRT: 27.5 SEC (ref 21–32)
ASCENDING AORTA: 2.33 CM
AST SERPL-CCNC: 11 U/L (ref 10–40)
AST SERPL-CCNC: 11 U/L (ref 10–40)
AST SERPL-CCNC: 15 U/L (ref 10–40)
AST SERPL-CCNC: 16 U/L (ref 10–40)
AV INDEX (PROSTH): 0.98
AV MEAN GRADIENT: 4 MMHG
AV PEAK GRADIENT: 7 MMHG
AV VALVE AREA: 3.23 CM2
AV VELOCITY RATIO: 0.82
BASOPHILS # BLD AUTO: 0.01 K/UL (ref 0–0.2)
BASOPHILS # BLD AUTO: 0.01 K/UL (ref 0–0.2)
BASOPHILS # BLD AUTO: 0.02 K/UL (ref 0–0.2)
BASOPHILS # BLD AUTO: 0.02 K/UL (ref 0–0.2)
BASOPHILS NFR BLD: 0.1 % (ref 0–1.9)
BASOPHILS NFR BLD: 0.2 % (ref 0–1.9)
BILIRUB DIRECT SERPL-MCNC: 0.1 MG/DL (ref 0.1–0.3)
BILIRUB SERPL-MCNC: 0.2 MG/DL (ref 0.1–1)
BILIRUB SERPL-MCNC: 0.3 MG/DL (ref 0.1–1)
BSA FOR ECHO PROCEDURE: 2.1 M2
BUN SERPL-MCNC: 7 MG/DL (ref 6–20)
BUN SERPL-MCNC: 9 MG/DL (ref 6–20)
CALCIUM SERPL-MCNC: 8.7 MG/DL (ref 8.7–10.5)
CALCIUM SERPL-MCNC: 8.7 MG/DL (ref 8.7–10.5)
CALCIUM SERPL-MCNC: 8.9 MG/DL (ref 8.7–10.5)
CALCIUM SERPL-MCNC: 9.9 MG/DL (ref 8.7–10.5)
CHLORIDE SERPL-SCNC: 130 MMOL/L (ref 95–110)
CHLORIDE SERPL-SCNC: >130 MMOL/L (ref 95–110)
CK MB SERPL-MCNC: 2.4 NG/ML (ref 0.1–6.5)
CK MB SERPL-MCNC: 3.5 NG/ML (ref 0.1–6.5)
CK MB SERPL-MCNC: 5.3 NG/ML (ref 0.1–6.5)
CK MB SERPL-MCNC: 8.4 NG/ML (ref 0.1–6.5)
CK MB SERPL-RTO: 3.1 % (ref 0–5)
CK MB SERPL-RTO: 4.4 % (ref 0–5)
CK MB SERPL-RTO: 5.4 % (ref 0–5)
CK MB SERPL-RTO: 7.6 % (ref 0–5)
CK SERPL-CCNC: 110 U/L (ref 20–180)
CK SERPL-CCNC: 110 U/L (ref 20–180)
CK SERPL-CCNC: 78 U/L (ref 20–180)
CK SERPL-CCNC: 78 U/L (ref 20–180)
CK SERPL-CCNC: 80 U/L (ref 20–180)
CK SERPL-CCNC: 80 U/L (ref 20–180)
CK SERPL-CCNC: 98 U/L (ref 20–180)
CK SERPL-CCNC: 98 U/L (ref 20–180)
CO2 SERPL-SCNC: 21 MMOL/L (ref 23–29)
CO2 SERPL-SCNC: 22 MMOL/L (ref 23–29)
CO2 SERPL-SCNC: 22 MMOL/L (ref 23–29)
CO2 SERPL-SCNC: 23 MMOL/L (ref 23–29)
CREAT SERPL-MCNC: 0.7 MG/DL (ref 0.5–1.4)
CREAT SERPL-MCNC: 0.8 MG/DL (ref 0.5–1.4)
CV ECHO LV RWT: 0.4 CM
DELSYS: ABNORMAL
DIFFERENTIAL METHOD: ABNORMAL
DOP CALC AO PEAK VEL: 1.32 M/S
DOP CALC AO VTI: 19.46 CM
DOP CALC LVOT AREA: 3.3 CM2
DOP CALC LVOT DIAMETER: 2.05 CM
DOP CALC LVOT PEAK VEL: 1.08 M/S
DOP CALC LVOT STROKE VOLUME: 62.78 CM3
DOP CALCLVOT PEAK VEL VTI: 19.03 CM
E/E' RATIO: 12.94 M/S
ECHO LV POSTERIOR WALL: 0.8 CM (ref 0.6–1.1)
EOSINOPHIL # BLD AUTO: 0 K/UL (ref 0–0.5)
EOSINOPHIL NFR BLD: 0 % (ref 0–8)
ERYTHROCYTE [DISTWIDTH] IN BLOOD BY AUTOMATED COUNT: 13.6 % (ref 11.5–14.5)
ERYTHROCYTE [DISTWIDTH] IN BLOOD BY AUTOMATED COUNT: 13.9 % (ref 11.5–14.5)
ERYTHROCYTE [DISTWIDTH] IN BLOOD BY AUTOMATED COUNT: 13.9 % (ref 11.5–14.5)
ERYTHROCYTE [DISTWIDTH] IN BLOOD BY AUTOMATED COUNT: 14.1 % (ref 11.5–14.5)
ERYTHROCYTE [SEDIMENTATION RATE] IN BLOOD BY WESTERGREN METHOD: 10 MM/H
ERYTHROCYTE [SEDIMENTATION RATE] IN BLOOD BY WESTERGREN METHOD: 14 MM/H
ERYTHROCYTE [SEDIMENTATION RATE] IN BLOOD BY WESTERGREN METHOD: 22 MM/H
EST. GFR  (AFRICAN AMERICAN): >60 ML/MIN/1.73 M^2
EST. GFR  (NON AFRICAN AMERICAN): >60 ML/MIN/1.73 M^2
FIO2: 100
FIO2: 50
FRACTIONAL SHORTENING: 35 % (ref 28–44)
GGT SERPL-CCNC: 38 U/L (ref 8–55)
GGT SERPL-CCNC: 38 U/L (ref 8–55)
GGT SERPL-CCNC: 42 U/L (ref 8–55)
GGT SERPL-CCNC: 48 U/L (ref 8–55)
GLUCOSE SERPL-MCNC: 164 MG/DL (ref 70–110)
GLUCOSE SERPL-MCNC: 195 MG/DL (ref 70–110)
GLUCOSE SERPL-MCNC: 195 MG/DL (ref 70–110)
GLUCOSE SERPL-MCNC: 200 MG/DL (ref 70–110)
HCO3 UR-SCNC: 20.4 MMOL/L (ref 24–28)
HCO3 UR-SCNC: 20.9 MMOL/L (ref 24–28)
HCO3 UR-SCNC: 21.1 MMOL/L (ref 24–28)
HCO3 UR-SCNC: 21.3 MMOL/L (ref 24–28)
HCO3 UR-SCNC: 21.5 MMOL/L (ref 24–28)
HCO3 UR-SCNC: 22.2 MMOL/L (ref 24–28)
HCO3 UR-SCNC: 23.9 MMOL/L (ref 24–28)
HCT VFR BLD AUTO: 29.1 % (ref 37–48.5)
HCT VFR BLD AUTO: 31.6 % (ref 37–48.5)
HCT VFR BLD AUTO: 31.6 % (ref 37–48.5)
HCT VFR BLD AUTO: 34.4 % (ref 37–48.5)
HGB BLD-MCNC: 10.9 G/DL (ref 12–16)
HGB BLD-MCNC: 9 G/DL (ref 12–16)
HGB BLD-MCNC: 9.8 G/DL (ref 12–16)
HGB BLD-MCNC: 9.8 G/DL (ref 12–16)
IMM GRANULOCYTES # BLD AUTO: 0.08 K/UL (ref 0–0.04)
IMM GRANULOCYTES # BLD AUTO: 0.1 K/UL (ref 0–0.04)
IMM GRANULOCYTES # BLD AUTO: 0.1 K/UL (ref 0–0.04)
IMM GRANULOCYTES # BLD AUTO: 0.13 K/UL (ref 0–0.04)
IMM GRANULOCYTES NFR BLD AUTO: 0.7 % (ref 0–0.5)
IMM GRANULOCYTES NFR BLD AUTO: 0.8 % (ref 0–0.5)
INR PPP: 1 (ref 0.8–1.2)
INTERVENTRICULAR SEPTUM: 0.9 CM (ref 0.6–1.1)
IVRT: 91.34 MSEC
LA MAJOR: 5.02 CM
LA MINOR: 4.99 CM
LA WIDTH: 3.03 CM
LDH SERPL L TO P-CCNC: 167 U/L (ref 110–260)
LDH SERPL L TO P-CCNC: 193 U/L (ref 110–260)
LDH SERPL L TO P-CCNC: 193 U/L (ref 110–260)
LDH SERPL L TO P-CCNC: 204 U/L (ref 110–260)
LEFT ATRIUM SIZE: 2.53 CM
LEFT ATRIUM VOLUME INDEX: 16 ML/M2
LEFT ATRIUM VOLUME: 32.61 CM3
LEFT INTERNAL DIMENSION IN SYSTOLE: 2.6 CM (ref 2.1–4)
LEFT VENTRICLE DIASTOLIC VOLUME INDEX: 22.88 ML/M2
LEFT VENTRICLE DIASTOLIC VOLUME: 46.54 ML
LEFT VENTRICLE MASS INDEX: 50 G/M2
LEFT VENTRICLE SYSTOLIC VOLUME INDEX: 10.2 ML/M2
LEFT VENTRICLE SYSTOLIC VOLUME: 20.69 ML
LEFT VENTRICULAR INTERNAL DIMENSION IN DIASTOLE: 4 CM (ref 3.5–6)
LEFT VENTRICULAR MASS: 101.43 G
LIPASE SERPL-CCNC: 115 U/L (ref 4–60)
LIPASE SERPL-CCNC: 142 U/L (ref 4–60)
LIPASE SERPL-CCNC: 142 U/L (ref 4–60)
LIPASE SERPL-CCNC: 234 U/L (ref 4–60)
LV LATERAL E/E' RATIO: 15.71 M/S
LV SEPTAL E/E' RATIO: 11 M/S
LYMPHOCYTES # BLD AUTO: 0.8 K/UL (ref 1–4.8)
LYMPHOCYTES # BLD AUTO: 0.9 K/UL (ref 1–4.8)
LYMPHOCYTES # BLD AUTO: 0.9 K/UL (ref 1–4.8)
LYMPHOCYTES # BLD AUTO: 1 K/UL (ref 1–4.8)
LYMPHOCYTES NFR BLD: 4.6 % (ref 18–48)
LYMPHOCYTES NFR BLD: 5.8 % (ref 18–48)
LYMPHOCYTES NFR BLD: 5.8 % (ref 18–48)
LYMPHOCYTES NFR BLD: 9.1 % (ref 18–48)
MAGNESIUM SERPL-MCNC: 2.1 MG/DL (ref 1.6–2.6)
MAGNESIUM SERPL-MCNC: 2.6 MG/DL (ref 1.6–2.6)
MCH RBC QN AUTO: 28.2 PG (ref 27–31)
MCH RBC QN AUTO: 28.4 PG (ref 27–31)
MCHC RBC AUTO-ENTMCNC: 30.9 G/DL (ref 32–36)
MCHC RBC AUTO-ENTMCNC: 31 G/DL (ref 32–36)
MCHC RBC AUTO-ENTMCNC: 31 G/DL (ref 32–36)
MCHC RBC AUTO-ENTMCNC: 31.7 G/DL (ref 32–36)
MCV RBC AUTO: 90 FL (ref 82–98)
MCV RBC AUTO: 91 FL (ref 82–98)
MCV RBC AUTO: 92 FL (ref 82–98)
MCV RBC AUTO: 92 FL (ref 82–98)
MIN VOL: 7.9
MIN VOL: 8.5
MIN VOL: 8.5
MIN VOL: 8.9
MODE: ABNORMAL
MONOCYTES # BLD AUTO: 1 K/UL (ref 0.3–1)
MONOCYTES # BLD AUTO: 1.2 K/UL (ref 0.3–1)
MONOCYTES # BLD AUTO: 1.3 K/UL (ref 0.3–1)
MONOCYTES # BLD AUTO: 1.3 K/UL (ref 0.3–1)
MONOCYTES NFR BLD: 7.5 % (ref 4–15)
MONOCYTES NFR BLD: 8.5 % (ref 4–15)
MONOCYTES NFR BLD: 8.5 % (ref 4–15)
MONOCYTES NFR BLD: 8.9 % (ref 4–15)
MV PEAK E VEL: 1.1 M/S
NEUTROPHILS # BLD AUTO: 12.8 K/UL (ref 1.8–7.7)
NEUTROPHILS # BLD AUTO: 12.8 K/UL (ref 1.8–7.7)
NEUTROPHILS # BLD AUTO: 14.2 K/UL (ref 1.8–7.7)
NEUTROPHILS # BLD AUTO: 9.2 K/UL (ref 1.8–7.7)
NEUTROPHILS NFR BLD: 81.1 % (ref 38–73)
NEUTROPHILS NFR BLD: 84.9 % (ref 38–73)
NEUTROPHILS NFR BLD: 84.9 % (ref 38–73)
NEUTROPHILS NFR BLD: 87 % (ref 38–73)
NRBC BLD-RTO: 0 /100 WBC
PCO2 BLDA: 31.1 MMHG (ref 35–45)
PCO2 BLDA: 34.2 MMHG (ref 35–45)
PCO2 BLDA: 36.5 MMHG (ref 35–45)
PCO2 BLDA: 37.6 MMHG (ref 35–45)
PCO2 BLDA: 38.1 MMHG (ref 35–45)
PCO2 BLDA: 39.5 MMHG (ref 35–45)
PCO2 BLDA: 43.6 MMHG (ref 35–45)
PEEP: 15
PEEP: 5
PEEP: 8
PH SMN: 7.34 [PH] (ref 7.35–7.45)
PH SMN: 7.35 [PH] (ref 7.35–7.45)
PH SMN: 7.36 [PH] (ref 7.35–7.45)
PH SMN: 7.37 [PH] (ref 7.35–7.45)
PH SMN: 7.38 [PH] (ref 7.35–7.45)
PH SMN: 7.38 [PH] (ref 7.35–7.45)
PH SMN: 7.43 [PH] (ref 7.35–7.45)
PHOSPHATE SERPL-MCNC: 2.2 MG/DL (ref 2.7–4.5)
PHOSPHATE SERPL-MCNC: 2.6 MG/DL (ref 2.7–4.5)
PHOSPHATE SERPL-MCNC: 2.6 MG/DL (ref 2.7–4.5)
PHOSPHATE SERPL-MCNC: <1 MG/DL (ref 2.7–4.5)
PIP: 21
PIP: 21
PIP: 25
PIP: 30
PISA TR MAX VEL: 2.15 M/S
PLATELET # BLD AUTO: 167 K/UL (ref 150–350)
PLATELET # BLD AUTO: 194 K/UL (ref 150–350)
PLATELET # BLD AUTO: 194 K/UL (ref 150–350)
PLATELET # BLD AUTO: 252 K/UL (ref 150–350)
PMV BLD AUTO: 11.4 FL (ref 9.2–12.9)
PMV BLD AUTO: 11.4 FL (ref 9.2–12.9)
PMV BLD AUTO: 11.5 FL (ref 9.2–12.9)
PMV BLD AUTO: 11.6 FL (ref 9.2–12.9)
PO2 BLDA: 149 MMHG (ref 80–100)
PO2 BLDA: 166 MMHG (ref 80–100)
PO2 BLDA: 166 MMHG (ref 80–100)
PO2 BLDA: 226 MMHG (ref 80–100)
PO2 BLDA: 400 MMHG (ref 80–100)
PO2 BLDA: 400 MMHG (ref 80–100)
PO2 BLDA: 411 MMHG (ref 80–100)
POC BE: -2 MMOL/L
POC BE: -3 MMOL/L
POC BE: -3 MMOL/L
POC BE: -4 MMOL/L
POC BE: -5 MMOL/L
POC SATURATED O2: 100 % (ref 95–100)
POC SATURATED O2: 99 % (ref 95–100)
POC TCO2: 21 MMOL/L (ref 23–27)
POC TCO2: 22 MMOL/L (ref 23–27)
POC TCO2: 22 MMOL/L (ref 23–27)
POC TCO2: 23 MMOL/L (ref 23–27)
POC TCO2: 25 MMOL/L (ref 23–27)
POTASSIUM SERPL-SCNC: 3.5 MMOL/L (ref 3.5–5.1)
POTASSIUM SERPL-SCNC: 3.7 MMOL/L (ref 3.5–5.1)
PROT SERPL-MCNC: 6 G/DL (ref 6–8.4)
PROT SERPL-MCNC: 6.3 G/DL (ref 6–8.4)
PROT SERPL-MCNC: 6.3 G/DL (ref 6–8.4)
PROT SERPL-MCNC: 7 G/DL (ref 6–8.4)
PROTHROMBIN TIME: 10.6 SEC (ref 9–12.5)
PROTHROMBIN TIME: 10.8 SEC (ref 9–12.5)
PROTHROMBIN TIME: 10.8 SEC (ref 9–12.5)
PROTHROMBIN TIME: 10.9 SEC (ref 9–12.5)
RA MAJOR: 3.53 CM
RA WIDTH: 2.9 CM
RBC # BLD AUTO: 3.19 M/UL (ref 4–5.4)
RBC # BLD AUTO: 3.45 M/UL (ref 4–5.4)
RBC # BLD AUTO: 3.45 M/UL (ref 4–5.4)
RBC # BLD AUTO: 3.84 M/UL (ref 4–5.4)
RIGHT VENTRICULAR END-DIASTOLIC DIMENSION: 2.64 CM
RV TISSUE DOPPLER FREE WALL SYSTOLIC VELOCITY 1 (APICAL 4 CHAMBER VIEW): 14.13 CM/S
SAMPLE: ABNORMAL
SINUS: 2.54 CM
SITE: ABNORMAL
SODIUM SERPL-SCNC: 155 MMOL/L (ref 136–145)
SODIUM SERPL-SCNC: 156 MMOL/L (ref 136–145)
SODIUM SERPL-SCNC: 157 MMOL/L (ref 136–145)
SODIUM SERPL-SCNC: 159 MMOL/L (ref 136–145)
SODIUM SERPL-SCNC: 160 MMOL/L (ref 136–145)
SODIUM SERPL-SCNC: 162 MMOL/L (ref 136–145)
SP02: 100
SP02: 188
SP02: 99
SP02: 99
STJ: 2.26 CM
TDI LATERAL: 0.07 M/S
TDI SEPTAL: 0.1 M/S
TDI: 0.09 M/S
TR MAX PG: 18 MMHG
TRICUSPID ANNULAR PLANE SYSTOLIC EXCURSION: 2.67 CM
TROPONIN I SERPL DL<=0.01 NG/ML-MCNC: 0.26 NG/ML (ref 0–0.03)
TROPONIN I SERPL DL<=0.01 NG/ML-MCNC: 0.31 NG/ML (ref 0–0.03)
TROPONIN I SERPL DL<=0.01 NG/ML-MCNC: 0.45 NG/ML (ref 0–0.03)
TROPONIN I SERPL DL<=0.01 NG/ML-MCNC: 0.53 NG/ML (ref 0–0.03)
VT: 360
VT: 360
VT: 380
VT: 550
VT: 885
WBC # BLD AUTO: 11.35 K/UL (ref 3.9–12.7)
WBC # BLD AUTO: 15.01 K/UL (ref 3.9–12.7)
WBC # BLD AUTO: 15.01 K/UL (ref 3.9–12.7)
WBC # BLD AUTO: 16.34 K/UL (ref 3.9–12.7)

## 2020-08-09 PROCEDURE — 84484 ASSAY OF TROPONIN QUANT: CPT | Mod: 91

## 2020-08-09 PROCEDURE — 25000003 PHARM REV CODE 250

## 2020-08-09 PROCEDURE — 20000000 HC ICU ROOM

## 2020-08-09 PROCEDURE — 82553 CREATINE MB FRACTION: CPT | Mod: 91

## 2020-08-09 PROCEDURE — 83615 LACTATE (LD) (LDH) ENZYME: CPT | Mod: 91

## 2020-08-09 PROCEDURE — 84484 ASSAY OF TROPONIN QUANT: CPT

## 2020-08-09 PROCEDURE — 94003 VENT MGMT INPAT SUBQ DAY: CPT

## 2020-08-09 PROCEDURE — 63600175 PHARM REV CODE 636 W HCPCS

## 2020-08-09 PROCEDURE — 83735 ASSAY OF MAGNESIUM: CPT

## 2020-08-09 PROCEDURE — 80053 COMPREHEN METABOLIC PANEL: CPT

## 2020-08-09 PROCEDURE — 82150 ASSAY OF AMYLASE: CPT | Mod: 91

## 2020-08-09 PROCEDURE — 82550 ASSAY OF CK (CPK): CPT | Mod: 91

## 2020-08-09 PROCEDURE — 37799 UNLISTED PX VASCULAR SURGERY: CPT

## 2020-08-09 PROCEDURE — 82248 BILIRUBIN DIRECT: CPT

## 2020-08-09 PROCEDURE — 82977 ASSAY OF GGT: CPT | Mod: 91

## 2020-08-09 PROCEDURE — 82977 ASSAY OF GGT: CPT

## 2020-08-09 PROCEDURE — 84100 ASSAY OF PHOSPHORUS: CPT

## 2020-08-09 PROCEDURE — 85730 THROMBOPLASTIN TIME PARTIAL: CPT

## 2020-08-09 PROCEDURE — 85730 THROMBOPLASTIN TIME PARTIAL: CPT | Mod: 91

## 2020-08-09 PROCEDURE — 99900026 HC AIRWAY MAINTENANCE (STAT)

## 2020-08-09 PROCEDURE — 85025 COMPLETE CBC W/AUTO DIFF WBC: CPT | Mod: 91

## 2020-08-09 PROCEDURE — 99900035 HC TECH TIME PER 15 MIN (STAT)

## 2020-08-09 PROCEDURE — 83615 LACTATE (LD) (LDH) ENZYME: CPT

## 2020-08-09 PROCEDURE — 83735 ASSAY OF MAGNESIUM: CPT | Mod: 91

## 2020-08-09 PROCEDURE — 99291 CRITICAL CARE FIRST HOUR: CPT | Mod: ,,, | Performed by: PSYCHIATRY & NEUROLOGY

## 2020-08-09 PROCEDURE — 80053 COMPREHEN METABOLIC PANEL: CPT | Mod: 91

## 2020-08-09 PROCEDURE — 82150 ASSAY OF AMYLASE: CPT

## 2020-08-09 PROCEDURE — 84100 ASSAY OF PHOSPHORUS: CPT | Mod: 91

## 2020-08-09 PROCEDURE — 82803 BLOOD GASES ANY COMBINATION: CPT

## 2020-08-09 PROCEDURE — 94667 MNPJ CHEST WALL 1ST: CPT

## 2020-08-09 PROCEDURE — 94640 AIRWAY INHALATION TREATMENT: CPT

## 2020-08-09 PROCEDURE — P9047 ALBUMIN (HUMAN), 25%, 50ML: HCPCS | Mod: JG

## 2020-08-09 PROCEDURE — 99291 PR CRITICAL CARE, E/M 30-74 MINUTES: ICD-10-PCS | Mod: ,,, | Performed by: PSYCHIATRY & NEUROLOGY

## 2020-08-09 PROCEDURE — 82553 CREATINE MB FRACTION: CPT

## 2020-08-09 PROCEDURE — 36415 COLL VENOUS BLD VENIPUNCTURE: CPT

## 2020-08-09 PROCEDURE — 82550 ASSAY OF CK (CPK): CPT

## 2020-08-09 PROCEDURE — 83690 ASSAY OF LIPASE: CPT

## 2020-08-09 PROCEDURE — 94761 N-INVAS EAR/PLS OXIMETRY MLT: CPT

## 2020-08-09 PROCEDURE — 81001 URINALYSIS AUTO W/SCOPE: CPT

## 2020-08-09 PROCEDURE — 85610 PROTHROMBIN TIME: CPT | Mod: 91

## 2020-08-09 PROCEDURE — 94668 MNPJ CHEST WALL SBSQ: CPT

## 2020-08-09 PROCEDURE — 25000242 PHARM REV CODE 250 ALT 637 W/ HCPCS

## 2020-08-09 PROCEDURE — 85610 PROTHROMBIN TIME: CPT

## 2020-08-09 PROCEDURE — 84295 ASSAY OF SERUM SODIUM: CPT | Mod: 91

## 2020-08-09 PROCEDURE — 84295 ASSAY OF SERUM SODIUM: CPT

## 2020-08-09 PROCEDURE — 83690 ASSAY OF LIPASE: CPT | Mod: 91

## 2020-08-09 PROCEDURE — 36600 WITHDRAWAL OF ARTERIAL BLOOD: CPT

## 2020-08-09 RX ORDER — ALBUMIN HUMAN 250 G/1000ML
25 SOLUTION INTRAVENOUS ONCE
Status: COMPLETED | OUTPATIENT
Start: 2020-08-09 | End: 2020-08-09

## 2020-08-09 RX ORDER — DOPAMINE HYDROCHLORIDE 160 MG/100ML
2 INJECTION, SOLUTION INTRAVENOUS CONTINUOUS
Status: DISCONTINUED | OUTPATIENT
Start: 2020-08-09 | End: 2020-08-19 | Stop reason: HOSPADM

## 2020-08-09 RX ORDER — POTASSIUM CHLORIDE 14.9 MG/ML
20 INJECTION INTRAVENOUS ONCE
Status: COMPLETED | OUTPATIENT
Start: 2020-08-09 | End: 2020-08-09

## 2020-08-09 RX ADMIN — IPRATROPIUM BROMIDE 0.5 MG: 0.5 SOLUTION RESPIRATORY (INHALATION) at 04:08

## 2020-08-09 RX ADMIN — DOPAMINE HYDROCHLORIDE IN DEXTROSE 2 MCG/KG/MIN: 1.6 INJECTION, SOLUTION INTRAVENOUS at 02:08

## 2020-08-09 RX ADMIN — SODIUM CHLORIDE: 0.45 INJECTION, SOLUTION INTRAVENOUS at 07:08

## 2020-08-09 RX ADMIN — SODIUM CHLORIDE: 0.45 INJECTION, SOLUTION INTRAVENOUS at 12:08

## 2020-08-09 RX ADMIN — ALBUTEROL SULFATE 2.5 MG: 2.5 SOLUTION RESPIRATORY (INHALATION) at 07:08

## 2020-08-09 RX ADMIN — AMPICILLIN AND SULBACTAM 1.5 G: 1; .5 INJECTION, POWDER, FOR SOLUTION INTRAVENOUS at 04:08

## 2020-08-09 RX ADMIN — POTASSIUM PHOSPHATE, MONOBASIC AND POTASSIUM PHOSPHATE, DIBASIC 30 MMOL: 224; 236 INJECTION, SOLUTION, CONCENTRATE INTRAVENOUS at 05:08

## 2020-08-09 RX ADMIN — POTASSIUM CHLORIDE 20 MEQ: 200 INJECTION, SOLUTION INTRAVENOUS at 12:08

## 2020-08-09 RX ADMIN — LEVALBUTEROL HYDROCHLORIDE 1.25 MG: 1.25 SOLUTION, CONCENTRATE RESPIRATORY (INHALATION) at 08:08

## 2020-08-09 RX ADMIN — AMPICILLIN AND SULBACTAM 1.5 G: 1; .5 INJECTION, POWDER, FOR SOLUTION INTRAVENOUS at 09:08

## 2020-08-09 RX ADMIN — LEVALBUTEROL HYDROCHLORIDE 1.25 MG: 1.25 SOLUTION, CONCENTRATE RESPIRATORY (INHALATION) at 12:08

## 2020-08-09 RX ADMIN — AMPICILLIN AND SULBACTAM 1.5 G: 1; .5 INJECTION, POWDER, FOR SOLUTION INTRAVENOUS at 03:08

## 2020-08-09 RX ADMIN — VASOPRESSIN 0.05 UNITS/HR: 20 INJECTION INTRAVENOUS at 07:08

## 2020-08-09 RX ADMIN — POTASSIUM CHLORIDE: 2 INJECTION, SOLUTION, CONCENTRATE INTRAVENOUS at 07:08

## 2020-08-09 RX ADMIN — HYDROCORTISONE SODIUM SUCCINATE 100 MG: 100 INJECTION, POWDER, FOR SOLUTION INTRAMUSCULAR; INTRAVENOUS at 05:08

## 2020-08-09 RX ADMIN — IPRATROPIUM BROMIDE 0.5 MG: 0.5 SOLUTION RESPIRATORY (INHALATION) at 08:08

## 2020-08-09 RX ADMIN — ALBUMIN (HUMAN) 25 G: 12.5 SOLUTION INTRAVENOUS at 12:08

## 2020-08-09 RX ADMIN — Medication 0.02 MCG/KG/MIN: at 07:08

## 2020-08-09 RX ADMIN — HYDROCORTISONE SODIUM SUCCINATE 100 MG: 100 INJECTION, POWDER, FOR SOLUTION INTRAMUSCULAR; INTRAVENOUS at 02:08

## 2020-08-09 RX ADMIN — HYDROCORTISONE SODIUM SUCCINATE 100 MG: 100 INJECTION, POWDER, FOR SOLUTION INTRAMUSCULAR; INTRAVENOUS at 09:08

## 2020-08-09 RX ADMIN — IPRATROPIUM BROMIDE 0.5 MG: 0.5 SOLUTION RESPIRATORY (INHALATION) at 12:08

## 2020-08-09 RX ADMIN — FLUCONAZOLE 100 MG: 2 INJECTION, SOLUTION INTRAVENOUS at 09:08

## 2020-08-09 RX ADMIN — IPRATROPIUM BROMIDE 0.5 MG: 0.5 SOLUTION RESPIRATORY (INHALATION) at 11:08

## 2020-08-09 RX ADMIN — POTASSIUM CHLORIDE: 2 INJECTION, SOLUTION, CONCENTRATE INTRAVENOUS at 06:08

## 2020-08-09 NOTE — SIGNIFICANT EVENT
Second brain death exam performed   Normothermic and without confounders  CT head and history implicate process which can progress to brain death  Exam:  coma  Pupils 6mm and unreactive to light  No oculocephalic response  No corneal response bilat  No gag, no cough  No movement in limbs to noxious  Apnea test reviewed  Current examination consistent with diagnosis of brain death

## 2020-08-09 NOTE — PROGRESS NOTES
CCLS met with family (pt mother and sisters) to provide support, legacy building, and educational resources for the pts children (6 and 8yo). At this time, the pts family were primarily interested in obtaining educational resources and wanted help talking to the children in a developmentally appropriate way regarding pt being being at end-of-life. CCLS met with pt's children along with the support of the pt's sisters to talk to the children about their mother being at end-of-life and to evaluate if the children wanted to go back and see the pt.  The children wanted to go see the pt and CCLS verbally prepared them for the different medical materials they were going to see and hear.Pt's children verbalized their understanding.  CCLS engaged pt children in memory making by completing hand prints of their hands along with the pt's hand. CCLS appreciates consult from Claudia gonsales. CCLS will continue to follow as needed.     Shruthi Kelly   [facility] Child Life Specialist    Ext. [phone]

## 2020-08-09 NOTE — HPI
34-year-old female, transfer to [facility] for ICH management. She originally presented to to [facility] ER  Via EMS after being found down unrepsonve at home.  No significant past medical history is known about this patient.  What is known is 2 weeks ago she developed a headache and was seen at an outside facility.  A CT scan was negative for acute intracranial process.  Tonight the patient was at home in her normal state of health when fell and was found down unresponsive,. EMS arrived and in route to  outside facility she had a PEA cardiac arrest about 10 min before  ROSC, at which pouit she was intubated, around 1730.  She had a  had a subsequent PEA arrest when she arrived in the ED at the outside facility, which lasted about 5-7min.  A CT scan that showed a large cerebral bleed with subarachnoid component. Transfer center contacted and tranfers to [facility] NCC accepted, recs for Mannitol given. Mannitol 100gm was given about 1930. Patient arrived to [facility] ER, GCS 3T, on vent. TOF performed, 4/4 twitches on brow setting 2, to R/o any paralytic that may have been left from intubation earlier.  Exam very poor, concerns for herniation,. CTA with herniation and hydrocephalus. NSGY consulted, STAT.  Given poor exam, herniation present, no NSGY option offered. Awaiting family to discuss prognosis.

## 2020-08-09 NOTE — ASSESSMENT & PLAN NOTE
Large cerebellar ICH  CTA with no obvious AVM or aneurysm  Already herniation on arrival  Grave prognosis  No NSGY intervention  CPP goal > 50

## 2020-08-10 ENCOUNTER — ANESTHESIA EVENT (OUTPATIENT)
Dept: SURGERY | Facility: HOSPITAL | Age: 35
DRG: 064 | End: 2020-08-10
Payer: OTHER GOVERNMENT

## 2020-08-10 ENCOUNTER — ANESTHESIA (OUTPATIENT)
Dept: SURGERY | Facility: HOSPITAL | Age: 35
DRG: 064 | End: 2020-08-10
Payer: OTHER GOVERNMENT

## 2020-08-10 VITALS
RESPIRATION RATE: 22 BRPM | HEIGHT: 66 IN | TEMPERATURE: 98 F | HEART RATE: 105 BPM | OXYGEN SATURATION: 100 % | BODY MASS INDEX: 33.43 KG/M2 | WEIGHT: 208 LBS

## 2020-08-10 LAB
ALBUMIN SERPL BCP-MCNC: 3.1 G/DL (ref 3.5–5.2)
ALLENS TEST: ABNORMAL
ALP SERPL-CCNC: 58 U/L (ref 55–135)
ALT SERPL W/O P-5'-P-CCNC: 32 U/L (ref 10–44)
AMYLASE SERPL-CCNC: 524 U/L (ref 20–110)
ANION GAP SERPL CALC-SCNC: 5 MMOL/L (ref 8–16)
APTT BLDCRRT: 27.3 SEC (ref 21–32)
AST SERPL-CCNC: 17 U/L (ref 10–40)
BASOPHILS # BLD AUTO: 0.01 K/UL (ref 0–0.2)
BASOPHILS NFR BLD: 0.1 % (ref 0–1.9)
BILIRUB SERPL-MCNC: 0.4 MG/DL (ref 0.1–1)
BILIRUB SERPL-MCNC: 0.4 MG/DL (ref 0.1–1)
BILIRUB UR QL STRIP: NEGATIVE
BUN SERPL-MCNC: 7 MG/DL (ref 6–20)
CALCIUM SERPL-MCNC: 8.7 MG/DL (ref 8.7–10.5)
CHLORIDE SERPL-SCNC: 127 MMOL/L (ref 95–110)
CK MB SERPL-MCNC: 1.7 NG/ML (ref 0.1–6.5)
CK MB SERPL-RTO: 2.7 % (ref 0–5)
CK SERPL-CCNC: 63 U/L (ref 20–180)
CK SERPL-CCNC: 63 U/L (ref 20–180)
CLARITY UR REFRACT.AUTO: CLEAR
CO2 SERPL-SCNC: 23 MMOL/L (ref 23–29)
COLOR UR AUTO: ABNORMAL
CREAT SERPL-MCNC: 0.7 MG/DL (ref 0.5–1.4)
DELSYS: ABNORMAL
DIFFERENTIAL METHOD: ABNORMAL
EOSINOPHIL # BLD AUTO: 0 K/UL (ref 0–0.5)
EOSINOPHIL NFR BLD: 0.2 % (ref 0–8)
ERYTHROCYTE [DISTWIDTH] IN BLOOD BY AUTOMATED COUNT: 14.2 % (ref 11.5–14.5)
ERYTHROCYTE [SEDIMENTATION RATE] IN BLOOD BY WESTERGREN METHOD: 22 MM/H
EST. GFR  (AFRICAN AMERICAN): >60 ML/MIN/1.73 M^2
EST. GFR  (NON AFRICAN AMERICAN): >60 ML/MIN/1.73 M^2
FIO2: 100
GGT SERPL-CCNC: 55 U/L (ref 8–55)
GLUCOSE SERPL-MCNC: 139 MG/DL (ref 70–110)
GLUCOSE UR QL STRIP: NEGATIVE
HCO3 UR-SCNC: 24.7 MMOL/L (ref 24–28)
HCT VFR BLD AUTO: 28.5 % (ref 37–48.5)
HGB BLD-MCNC: 8.8 G/DL (ref 12–16)
HGB UR QL STRIP: ABNORMAL
HYALINE CASTS UR QL AUTO: 1 /LPF
IMM GRANULOCYTES # BLD AUTO: 0.04 K/UL (ref 0–0.04)
IMM GRANULOCYTES NFR BLD AUTO: 0.4 % (ref 0–0.5)
INR PPP: 1 (ref 0.8–1.2)
KETONES UR QL STRIP: NEGATIVE
LDH SERPL L TO P-CCNC: 173 U/L (ref 110–260)
LEUKOCYTE ESTERASE UR QL STRIP: NEGATIVE
LIPASE SERPL-CCNC: 228 U/L (ref 4–60)
LYMPHOCYTES # BLD AUTO: 1 K/UL (ref 1–4.8)
LYMPHOCYTES NFR BLD: 10.1 % (ref 18–48)
MAGNESIUM SERPL-MCNC: 1.9 MG/DL (ref 1.6–2.6)
MCH RBC QN AUTO: 28.3 PG (ref 27–31)
MCHC RBC AUTO-ENTMCNC: 30.9 G/DL (ref 32–36)
MCV RBC AUTO: 92 FL (ref 82–98)
MICROSCOPIC COMMENT: NORMAL
MODE: ABNORMAL
MONOCYTES # BLD AUTO: 0.7 K/UL (ref 0.3–1)
MONOCYTES NFR BLD: 7 % (ref 4–15)
NEUTROPHILS # BLD AUTO: 8.2 K/UL (ref 1.8–7.7)
NEUTROPHILS NFR BLD: 82.2 % (ref 38–73)
NITRITE UR QL STRIP: NEGATIVE
NRBC BLD-RTO: 0 /100 WBC
PCO2 BLDA: 41.6 MMHG (ref 35–45)
PEEP: 5
PH SMN: 7.38 [PH] (ref 7.35–7.45)
PH UR STRIP: 6 [PH] (ref 5–8)
PHOSPHATE SERPL-MCNC: 2.4 MG/DL (ref 2.7–4.5)
PLATELET # BLD AUTO: 165 K/UL (ref 150–350)
PMV BLD AUTO: 11.9 FL (ref 9.2–12.9)
PO2 BLDA: 382 MMHG (ref 80–100)
POC BE: 0 MMOL/L
POC SATURATED O2: 100 % (ref 95–100)
POC TCO2: 26 MMOL/L (ref 23–27)
POTASSIUM SERPL-SCNC: 3.5 MMOL/L (ref 3.5–5.1)
PROT SERPL-MCNC: 5.8 G/DL (ref 6–8.4)
PROT UR QL STRIP: NEGATIVE
PROTHROMBIN TIME: 10.8 SEC (ref 9–12.5)
RBC # BLD AUTO: 3.11 M/UL (ref 4–5.4)
RBC #/AREA URNS AUTO: 0 /HPF (ref 0–4)
SAMPLE: ABNORMAL
SITE: ABNORMAL
SODIUM SERPL-SCNC: 155 MMOL/L (ref 136–145)
SODIUM SERPL-SCNC: 155 MMOL/L (ref 136–145)
SP GR UR STRIP: 1 (ref 1–1.03)
SP02: 100
TROPONIN I SERPL DL<=0.01 NG/ML-MCNC: 0.17 NG/ML (ref 0–0.03)
URN SPEC COLLECT METH UR: ABNORMAL
VT: 360
WBC # BLD AUTO: 9.97 K/UL (ref 3.9–12.7)
WBC #/AREA URNS AUTO: 1 /HPF (ref 0–5)

## 2020-08-10 PROCEDURE — 36000708 HC OR TIME LEV III 1ST 15 MIN

## 2020-08-10 PROCEDURE — 12000002 HC ACUTE/MED SURGE SEMI-PRIVATE ROOM

## 2020-08-10 PROCEDURE — D9220A PRA ANESTHESIA: ICD-10-PCS | Mod: CRNA,,, | Performed by: NURSE ANESTHETIST, CERTIFIED REGISTERED

## 2020-08-10 PROCEDURE — 82803 BLOOD GASES ANY COMBINATION: CPT

## 2020-08-10 PROCEDURE — D9220A PRA ANESTHESIA: Mod: CRNA,,, | Performed by: NURSE ANESTHETIST, CERTIFIED REGISTERED

## 2020-08-10 PROCEDURE — 25000003 PHARM REV CODE 250: Performed by: NURSE ANESTHETIST, CERTIFIED REGISTERED

## 2020-08-10 PROCEDURE — 37799 UNLISTED PX VASCULAR SURGERY: CPT

## 2020-08-10 PROCEDURE — 99900026 HC AIRWAY MAINTENANCE (STAT)

## 2020-08-10 PROCEDURE — D9220A PRA ANESTHESIA: Mod: ANES,,, | Performed by: ANESTHESIOLOGY

## 2020-08-10 PROCEDURE — 99239 HOSP IP/OBS DSCHRG MGMT >30: CPT | Mod: ,,, | Performed by: PSYCHIATRY & NEUROLOGY

## 2020-08-10 PROCEDURE — 36000709 HC OR TIME LEV III EA ADD 15 MIN

## 2020-08-10 PROCEDURE — 63600175 PHARM REV CODE 636 W HCPCS

## 2020-08-10 PROCEDURE — 94761 N-INVAS EAR/PLS OXIMETRY MLT: CPT

## 2020-08-10 PROCEDURE — 63600175 PHARM REV CODE 636 W HCPCS: Performed by: NURSE ANESTHETIST, CERTIFIED REGISTERED

## 2020-08-10 PROCEDURE — D9220A PRA ANESTHESIA: ICD-10-PCS | Mod: ANES,,, | Performed by: ANESTHESIOLOGY

## 2020-08-10 PROCEDURE — 99239 PR HOSPITAL DISCHARGE DAY,>30 MIN: ICD-10-PCS | Mod: ,,, | Performed by: PSYCHIATRY & NEUROLOGY

## 2020-08-10 PROCEDURE — 99900035 HC TECH TIME PER 15 MIN (STAT)

## 2020-08-10 PROCEDURE — 94668 MNPJ CHEST WALL SBSQ: CPT

## 2020-08-10 PROCEDURE — 37000008 HC ANESTHESIA 1ST 15 MINUTES

## 2020-08-10 PROCEDURE — 37000009 HC ANESTHESIA EA ADD 15 MINS

## 2020-08-10 RX ORDER — FUROSEMIDE 10 MG/ML
10 INJECTION INTRAMUSCULAR; INTRAVENOUS ONCE
Status: COMPLETED | OUTPATIENT
Start: 2020-08-10 | End: 2020-08-10

## 2020-08-10 RX ORDER — PHENYLEPHRINE HYDROCHLORIDE 10 MG/ML
INJECTION INTRAVENOUS
Status: DISCONTINUED | OUTPATIENT
Start: 2020-08-10 | End: 2020-08-10

## 2020-08-10 RX ORDER — HEPARIN SODIUM 1000 [USP'U]/ML
INJECTION, SOLUTION INTRAVENOUS; SUBCUTANEOUS
Status: DISCONTINUED | OUTPATIENT
Start: 2020-08-10 | End: 2020-08-10

## 2020-08-10 RX ORDER — FUROSEMIDE 10 MG/ML
INJECTION INTRAMUSCULAR; INTRAVENOUS
Status: COMPLETED
Start: 2020-08-10 | End: 2020-08-10

## 2020-08-10 RX ORDER — VASOPRESSIN 20 [USP'U]/ML
INJECTION, SOLUTION INTRAMUSCULAR; SUBCUTANEOUS
Status: DISCONTINUED | OUTPATIENT
Start: 2020-08-10 | End: 2020-08-10

## 2020-08-10 RX ORDER — FENTANYL CITRATE 50 UG/ML
INJECTION, SOLUTION INTRAMUSCULAR; INTRAVENOUS
Status: DISCONTINUED | OUTPATIENT
Start: 2020-08-10 | End: 2020-08-10

## 2020-08-10 RX ORDER — MANNITOL 250 MG/ML
INJECTION, SOLUTION INTRAVENOUS
Status: DISCONTINUED | OUTPATIENT
Start: 2020-08-10 | End: 2020-08-10

## 2020-08-10 RX ORDER — ROCURONIUM BROMIDE 10 MG/ML
INJECTION, SOLUTION INTRAVENOUS
Status: DISCONTINUED | OUTPATIENT
Start: 2020-08-10 | End: 2020-08-10

## 2020-08-10 RX ADMIN — ROCURONIUM BROMIDE 50 MG: 10 INJECTION, SOLUTION INTRAVENOUS at 03:08

## 2020-08-10 RX ADMIN — FENTANYL CITRATE 50 MCG: 50 INJECTION, SOLUTION INTRAMUSCULAR; INTRAVENOUS at 04:08

## 2020-08-10 RX ADMIN — FUROSEMIDE 10 MG: 10 INJECTION, SOLUTION INTRAMUSCULAR; INTRAVENOUS at 12:08

## 2020-08-10 RX ADMIN — PHENYLEPHRINE HYDROCHLORIDE 200 MCG: 10 INJECTION INTRAVENOUS at 04:08

## 2020-08-10 RX ADMIN — SODIUM CHLORIDE, SODIUM GLUCONATE, SODIUM ACETATE, POTASSIUM CHLORIDE, MAGNESIUM CHLORIDE, SODIUM PHOSPHATE, DIBASIC, AND POTASSIUM PHOSPHATE: .53; .5; .37; .037; .03; .012; .00082 INJECTION, SOLUTION INTRAVENOUS at 04:08

## 2020-08-10 RX ADMIN — VASOPRESSIN 1 UNITS: 20 INJECTION INTRAVENOUS at 05:08

## 2020-08-10 RX ADMIN — ROCURONIUM BROMIDE 50 MG: 10 INJECTION, SOLUTION INTRAVENOUS at 05:08

## 2020-08-10 RX ADMIN — MANNITOL 25 G: 250 INJECTION, SOLUTION INTRAVENOUS at 04:08

## 2020-08-10 RX ADMIN — HEPARIN SODIUM 30000 UNITS: 1000 INJECTION, SOLUTION INTRAVENOUS; SUBCUTANEOUS at 05:08

## 2020-08-10 NOTE — PLAN OF CARE
Per MD:  Brain Death 2020 16:29       08/10/20 0954   Final Note   Assessment Type Final Discharge Note   Anticipated Discharge Disposition        Kristal Concepcion RN, CCRN-K, [facility]  Neuro-Critical Care   [phone]

## 2020-08-10 NOTE — PLAN OF CARE
08/10/20 0956   Post-Acute Status   Post-Acute Authorization Other  (Patient )   Other Status No Post-Acute Service Needs       Kristal Concepcion RN, CCRN-K, [facility]  Neuro-Critical Care   [phone]

## 2020-08-10 NOTE — ANESTHESIA PREPROCEDURE EVALUATION
08/10/2020  Niecy Sevilla is a 34 y.o., female who presents for organ procurement s/p ICH and has been declared brain dead x2. No significant past medical history.    Anesthesia Evaluation    I have reviewed the Patient Summary Reports.    I have reviewed the Nursing Notes. I have reviewed the NPO Status.   I have reviewed the Medications.     Review of Systems  Anesthesia Hx:  No previous Anesthesia    Social:  Non-Smoker  Denies Tobacco Use.   Cardiovascular:   Denies Hypertension.  Denies MI.  Denies CAD.    Denies CABG/stent.  Denies Dysrhythmias.  no hyperlipidemia  Denies Coronary Artery Disease.   Denies Hypertension.    Pulmonary:   Denies COPD.  Denies Asthma.  Denies Shortness of breath.  Denies Recent URI.  Denies Asthma.  Denies Chronic Obstructive Pulmonary Disease (COPD).    Renal/:   Denies Chronic Renal Disease.   Denies Kidney Function/Disease    Hepatic/GI:   Denies GERD. Denies Liver Disease.  Denies Esophageal / Stomach Disorders  Denies Liver Disease    Neurological:   Denies TIA. Denies CVA. Denies Seizures.  Cognitive Impairment , current radha coma score of 3. Denies Seizure Disorder  Denies CVA - Cerebrovasular Accident  Denies TIA - Transient Ischemic Attack  CNS Hemorrhage  (Central Nervous System), Intracranial Hemorrhage, Intracerebral Hemorrhage    Endocrine:   Denies Diabetes. Denies Hypothyroidism.  Denies Diabetes  Denies Thyroid Disease  Metabolic Disorders, Obesity / BMI > 30      Physical Exam  General:  Obesity    Airway/Jaw/Neck:  Airway Findings: Pre-Existing Airway Tube(s): Oral Endotracheal tube Size: 7.5  General Airway Assessment: Adult       Chest/Lungs:  Chest/Lungs Findings: Clear to auscultation, Normal Respiratory Rate     Heart/Vascular:  Heart Findings: Rate: Normal  Rhythm: Regular Rhythm  Sounds: Normal        Mental Status:  Mental Status Findings:   Unconscious         Anesthesia Plan  Type of Anesthesia, risks & benefits discussed:  Anesthesia Type:  general  Patient's Preference:   Intra-op Monitoring Plan: standard ASA monitors, arterial line and central line  Intra-op Monitoring Plan Comments:   Post Op Pain Control Plan: IV/PO Opioids PRN  Post Op Pain Control Plan Comments:   Induction:   IV  Beta Blocker:  Patient is not currently on a Beta-Blocker (No further documentation required).       Informed Consent: Patient representative understands risks and agrees with Anesthesia plan.  Questions answered. Anesthesia consent signed with patient representative.  ASA Score: 6  emergent   Day of Surgery Review of History & Physical:    H&P update referred to the surgeon.         Ready For Surgery From Anesthesia Perspective.

## 2020-08-10 NOTE — PLAN OF CARE
Cross clamp time:0555  Kidneys Out Time: 0643  Liver Out Time: 0638  Lungs out Time: 0635  Heart Out :0616  SUSY Out of OR Time:0824

## 2020-08-10 NOTE — ANESTHESIA POSTPROCEDURE EVALUATION
Anesthesia Post Evaluation    Patient: Niecy Di    Procedure(s) Performed: Procedure(s) (LRB):  NEPHRECTOMY,  DONOR Heart, Lungs, Kidney, Liver harvest (Bilateral)            Anesthetic complications: no              No case tracking events are documented in the log.      Pain/Glenn Score: No data recorded

## 2020-08-11 LAB
BACTERIA SPEC AEROBE CULT: NORMAL
BACTERIA SPEC AEROBE CULT: NORMAL
BACTERIA UR CULT: NO GROWTH
GRAM STN SPEC: NORMAL

## 2020-08-11 PROCEDURE — 12000002 HC ACUTE/MED SURGE SEMI-PRIVATE ROOM

## 2020-08-12 PROCEDURE — 12000002 HC ACUTE/MED SURGE SEMI-PRIVATE ROOM

## 2020-08-13 PROCEDURE — 12000002 HC ACUTE/MED SURGE SEMI-PRIVATE ROOM

## 2020-08-14 LAB
BACTERIA BLD CULT: NORMAL
BACTERIA BLD CULT: NORMAL

## 2020-08-14 PROCEDURE — 12000002 HC ACUTE/MED SURGE SEMI-PRIVATE ROOM

## 2020-08-15 PROCEDURE — 12000002 HC ACUTE/MED SURGE SEMI-PRIVATE ROOM

## 2020-08-16 PROCEDURE — 12000002 HC ACUTE/MED SURGE SEMI-PRIVATE ROOM

## 2020-08-17 PROCEDURE — 12000002 HC ACUTE/MED SURGE SEMI-PRIVATE ROOM

## 2020-08-18 PROCEDURE — 12000002 HC ACUTE/MED SURGE SEMI-PRIVATE ROOM

## 2020-08-23 ENCOUNTER — NURSE TRIAGE (OUTPATIENT)
Dept: ADMINISTRATIVE | Facility: CLINIC | Age: 35
End: 2020-08-23

## 2020-08-25 NOTE — DISCHARGE SUMMARY
[facility]  Neurocritical Care  Discharge Summary    Admit Date: 8/8/2020    Service Date: 08/25/2020    Discharge Date: discharged to [facility] service on 8/8/20     Length of Stay: 2    Final Active Diagnoses:    Diagnosis Date Noted POA    Cerebellar hemorrhage [I61.4] 08/07/2020 Yes    Brain herniation [G93.5] 08/07/2020 Yes    Cardiac arrest [I46.9] 08/07/2020 Yes    Acute respiratory failure with hypoxia and hypercapnia [J96.01, J96.02] 08/07/2020 Yes    Radha coma scale total score 3-8 [R40.2430] 08/07/2020 Yes      Problems Resolved During this Admission:      History of Present Illness: 34-year-old female, transfer to [facility] ER for ICH management. She originally presented to to [facility] ER  Via EMS after being found down unrepsonve at home.  No significant past medical history is known about this patient.  What is known is 2 weeks ago she developed a headache and was seen at an outside facility.  A CT scan was negative for acute intracranial process.  Tonight the patient was at home in her normal state of health when fell and was found down unresponsive,. EMS arrived and in route to  outside facility she had a PEA cardiac arrest about 10 min before  ROSC, at which pouit she was intubated, around 1730.  She had a  had a subsequent PEA arrest when she arrived in the ED at the outside facility, which lasted about 5-7min.  A CT scan that showed a large cerebral bleed with subarachnoid component. Transfer center contacted and tranfers to [facility] NCC accepted, recs for Mannitol given. Mannitol 100gm was given about 1930. Patient arrived to [facility] ER, GCS 3T, on vent. TOF performed, 4/4 twitches on brow setting 2, to R/o any paralytic that may have been left from intubation earlier.  Exam very poor, concerns for herniation,. CTA with herniation and hydrocephalus. NSGY consulted, STAT.  Given poor exam, herniation present, no NSGY option offered. Awaiting family to discuss prognosis.     Hospital Course  by Event: : brain death exam    Hospital Course by Problem:   [place] coma scale total score 3-8  COMA  No commands  No response      Acute respiratory failure with hypoxia and hypercapnia  No vent weaning  Apnea test today      Cardiac arrest  Cardiac arrest at home x2  See cerebellar ICH    Brain herniation  See cerebellar ICH      Cerebellar hemorrhage  Large cerebellar ICH  CTA with no obvious AVM or aneurysm  Already herniation on arrival  Grave prognosis  No NSGY intervention  CPP goal > 50      Patient was declared brain dead and transferred to the care of [facility] on 20    Significant Results:  Imaging:  See PACS    Cardiology:  See ECHO and ECGs    Microbiology:  See micro    Laboratory:  Lab Results   Component Value Date    HGBA1C 5.2 2020    CHOL 193 2020    HDL 44 2020    LDLCALC 120.0 2020    TRIG 145 2020    TSH 0.657 2020   none    Pending Results: none    Consultations:  None  none    Procedures:   Procedure(s) (LRB):  NEPHRECTOMY,  DONOR Heart, Lungs, Kidney, Liver harvest (Bilateral)  BRONCHOSCOPY (Flexible) (N/A) by Organ Procurement.      Medications:   There are no discharge medications for this patient.     Diet: NPO    Activity: As tolerated.     Disposition: Discharged to [facility] in brain dead condition.    Follow Up Plan:  [facility] for organ procurement    This discharge took > than 30 minutes to complete.    Carlos Manuel Lees MD  Neurocritical Care  [facility]